# Patient Record
Sex: FEMALE | Race: WHITE | NOT HISPANIC OR LATINO | Employment: OTHER | URBAN - METROPOLITAN AREA
[De-identification: names, ages, dates, MRNs, and addresses within clinical notes are randomized per-mention and may not be internally consistent; named-entity substitution may affect disease eponyms.]

---

## 2017-03-14 ENCOUNTER — OFFICE VISIT (OUTPATIENT)
Dept: ENDOCRINOLOGY | Facility: CLINIC | Age: 57
End: 2017-03-14

## 2017-03-14 VITALS
BODY MASS INDEX: 33.24 KG/M2 | OXYGEN SATURATION: 97 % | DIASTOLIC BLOOD PRESSURE: 78 MMHG | SYSTOLIC BLOOD PRESSURE: 124 MMHG | HEIGHT: 63 IN | WEIGHT: 187.6 LBS | HEART RATE: 72 BPM

## 2017-03-14 DIAGNOSIS — R63.5 WEIGHT GAIN: ICD-10-CM

## 2017-03-14 DIAGNOSIS — E28.2 PCOS (POLYCYSTIC OVARIAN SYNDROME): Primary | ICD-10-CM

## 2017-03-14 DIAGNOSIS — L68.0 HIRSUTISM: ICD-10-CM

## 2017-03-14 LAB
ALBUMIN SERPL-MCNC: 4.3 G/DL (ref 3.2–4.8)
ALBUMIN/GLOB SERPL: 1.7 G/DL (ref 1.5–2.5)
ALP SERPL-CCNC: 75 U/L (ref 25–100)
ALT SERPL W P-5'-P-CCNC: 16 U/L (ref 7–40)
ANION GAP SERPL CALCULATED.3IONS-SCNC: 9 MMOL/L (ref 3–11)
AST SERPL-CCNC: 18 U/L (ref 0–33)
BILIRUB SERPL-MCNC: 0.2 MG/DL (ref 0.3–1.2)
BUN BLD-MCNC: 11 MG/DL (ref 9–23)
BUN/CREAT SERPL: 15.7 (ref 7–25)
CALCIUM SPEC-SCNC: 9.6 MG/DL (ref 8.7–10.4)
CHLORIDE SERPL-SCNC: 109 MMOL/L (ref 99–109)
CO2 SERPL-SCNC: 24 MMOL/L (ref 20–31)
CREAT BLD-MCNC: 0.7 MG/DL (ref 0.6–1.3)
GFR SERPL CREATININE-BSD FRML MDRD: 87 ML/MIN/1.73
GLOBULIN UR ELPH-MCNC: 2.6 GM/DL
GLUCOSE BLD-MCNC: 108 MG/DL (ref 70–100)
POTASSIUM BLD-SCNC: 3.7 MMOL/L (ref 3.5–5.5)
PROT SERPL-MCNC: 6.9 G/DL (ref 5.7–8.2)
SODIUM BLD-SCNC: 142 MMOL/L (ref 132–146)
TSH SERPL DL<=0.05 MIU/L-ACNC: 2.78 MIU/ML (ref 0.35–5.35)

## 2017-03-14 PROCEDURE — 84443 ASSAY THYROID STIM HORMONE: CPT | Performed by: INTERNAL MEDICINE

## 2017-03-14 PROCEDURE — 80053 COMPREHEN METABOLIC PANEL: CPT | Performed by: INTERNAL MEDICINE

## 2017-03-14 PROCEDURE — 99214 OFFICE O/P EST MOD 30 MIN: CPT | Performed by: INTERNAL MEDICINE

## 2017-03-14 RX ORDER — SPIRONOLACTONE 50 MG/1
100 TABLET, FILM COATED ORAL DAILY
Qty: 60 TABLET | Refills: 11 | Status: SHIPPED | OUTPATIENT
Start: 2017-03-14 | End: 2018-01-16 | Stop reason: SDUPTHER

## 2017-03-14 RX ORDER — HYDROCODONE BITARTRATE AND ACETAMINOPHEN 5; 325 MG/1; MG/1
TABLET ORAL
COMMUNITY
Start: 2017-02-14 | End: 2017-03-14

## 2017-03-14 NOTE — PROGRESS NOTES
Subjective:   Polycystic Ovary Syndrome (F/u for PCOS, C/o fluctuations in weight, pt stated she stopped taking metformin on Monday due to having diarrhea, fatigue and night sweats.)        Kelly Meza is a 57 y.o. female who is being seen for consultation today at the request of No ref. provider found   follow-up for hyperglycemia, abnormal thyroid function tests and obesity. In July 2016 patient had evaluation by primary doctor and labs showed normal prolactin of 6.4 and free T4 of 1, TSH of 2.66. She reported family history of thyroid disease.  Patient reported difficulty with weight loss despite diet and exercises. She is currently in HMR program. - lost  20 lbs since June, then the weight stabilized and she stopped the program. Now she is going with the weight watchers with no effect.      She also has a symptoms of facial hair and started on Aviane. Changed to low Seasonique last visit.     Symptoms consist of weight gain, hirsutism  She is very frustrated with the weight loss situation.   Last visit started metformin and developed side effects of diarrhea, increased sweating.     Heat intolerance, swelling and diarrhea improved after stopping metformin last week. SHe didn't gain any benefit for weight while taking, it .   She is following weight watchers, stopped HMR.      Review of Systems  Review of Systems   Constitutional: Positive for appetite change (poor appetite), fatigue and unexpected weight change (weight gain). Negative for activity change, chills and diaphoresis.   HENT: Negative for congestion, ear pain, facial swelling, hearing loss, postnasal drip and trouble swallowing.    Eyes: Negative for visual disturbance.   Respiratory: Negative for cough.    Cardiovascular: Negative for chest pain, palpitations and leg swelling.   Gastrointestinal: Negative for abdominal distention, abdominal pain, constipation, diarrhea, nausea and vomiting.   Endocrine: Positive for cold intolerance and heat  intolerance.        As listed in HPI   Genitourinary: Negative.  Negative for menstrual problem (she had menses up until 50 yo when started OCP. ).   Musculoskeletal: Positive for back pain and neck pain. Negative for arthralgias, joint swelling, myalgias and neck stiffness.   Skin: Negative.         Facial hair   Allergic/Immunologic: Positive for environmental allergies.   Neurological: Positive for weakness, numbness (tingling in the right arm>left. ) and headaches. Negative for dizziness, tremors, syncope and light-headedness.   Hematological: Negative.    Psychiatric/Behavioral: Positive for sleep disturbance. The patient is nervous/anxious.    All other systems reviewed and are negative.     Current medications:  Current Outpatient Prescriptions   Medication Sig Dispense Refill   • clonazePAM (KlonoPIN) 1 MG tablet      • Levonorgest-Eth Estrad 91-Day 0.15-0.03 &0.01 MG tablet Take 1 tablet by mouth Daily. 91 each 1   • levonorgestrel-ethinyl estradiol (AVIANE,ALESSE,LESSINA) 0.1-20 MG-MCG per tablet 1 daily     • mometasone-formoterol (DULERA 100) 100-5 MCG/ACT inhaler Inhale 2 puffs 2 (Two) Times a Day.     • Multiple Vitamins-Minerals (MULTIVITAMIN ADULT PO) Take  by mouth.     • PREMARIN 0.625 MG/GM vaginal cream      • QUEtiapine (SEROquel) 200 MG tablet      • VENTOLIN  (90 BASE) MCG/ACT inhaler PRN       No current facility-administered medications for this visit.        The following portions of the patient's history were reviewed and updated as appropriate: She  has a past medical history of Asthma; Basal cell carcinoma of skin; Bilateral ovarian cysts; Cervical spine disease; Heart murmur; and Migraine.  She  has a past surgical history that includes Breast lumpectomy (Right, 2009); Rotator cuff repair (Right); and Finger surgery.  Her family history includes Arthritis in her mother; Cancer in her father and mother; Hypertension in her maternal grandfather and mother; Kidney disease in her  paternal grandmother; Liver disease in her father; Osteoporosis in her maternal grandmother; Thyroid disease in her mother.  She  reports that she has never smoked. She has never used smokeless tobacco. She reports that she does not drink alcohol. Her drug history is not on file.  She is allergic to ciprofloxacin; latex; codeine sulfate; and pollen extract..      Objective:     Vitals:    03/14/17 1619   BP: 124/78   Pulse: 72   SpO2: 97%     Physical Exam   Constitutional: She is oriented to person, place, and time. She appears well-developed and well-nourished.   HENT:   Head: Normocephalic and atraumatic.   Eyes: Conjunctivae are normal.   Neck: No thyromegaly present.   The neck is supple and symmetric   Cardiovascular: Normal rate, regular rhythm and normal heart sounds.    Pulmonary/Chest: Effort normal and breath sounds normal.   Musculoskeletal: She exhibits no edema.   Lymphadenopathy:     She has no cervical adenopathy.   Neurological: She is alert and oriented to person, place, and time.   Skin: Skin is warm and dry. No rash noted.   hirsutism of the chin and cheeks, neck.    Psychiatric: She has a normal mood and affect. Thought content normal.   Vitals reviewed.      LABS AND IMAGING          Assessment:        Problem List Items Addressed This Visit        Endocrine    PCOS (polycystic ovarian syndrome) - Primary    Relevant Orders    Comprehensive Metabolic Panel    TSH       Musculoskeletal and Integument    Hirsutism       Other    Weight gain               Plan:          PCOS with the sx of wieght gain and hirsutism. Different management options reviewed.     Continue with the weight loss program and I have gone over different strategies to maintain low calorie Intake.   -different weight loss medications available on the market reviewed and info was handed to her. (Qsymia, Saxenda, Contrave)  -start Contrave, side effects, coupons and booklet on this medication was provided to her.     - for  hirsutism consider spironolactone, start at 50 mg and slowly titrate up to 100 mg a day as tolerated. I have explained that this is the dose effective for hirsutism. Side effects reviewed. .          Follow-up in 4 months

## 2017-03-29 ENCOUNTER — TRANSCRIBE ORDERS (OUTPATIENT)
Dept: ADMINISTRATIVE | Facility: HOSPITAL | Age: 57
End: 2017-03-29

## 2017-03-29 ENCOUNTER — APPOINTMENT (OUTPATIENT)
Dept: LAB | Facility: HOSPITAL | Age: 57
End: 2017-03-29

## 2017-03-29 DIAGNOSIS — J45.51 SEVERE PERSISTENT ASTHMA WITH EXACERBATION: Primary | ICD-10-CM

## 2017-03-29 LAB
DEPRECATED RDW RBC AUTO: 43.4 FL (ref 37–54)
EOSINOPHIL # BLD MANUAL: 0.18 10*3/MM3 (ref 0–0.7)
EOSINOPHIL NFR BLD MANUAL: 3 % (ref 0–7)
ERYTHROCYTE [DISTWIDTH] IN BLOOD BY AUTOMATED COUNT: 13.2 % (ref 11.5–14.5)
HCT VFR BLD AUTO: 39.4 % (ref 37–47)
HGB BLD-MCNC: 13.1 G/DL (ref 12–16)
LYMPHOCYTES # BLD MANUAL: 0.84 10*3/MM3 (ref 0.6–3.4)
LYMPHOCYTES NFR BLD MANUAL: 14 % (ref 10–50)
LYMPHOCYTES NFR BLD MANUAL: 9 % (ref 0–12)
MCH RBC QN AUTO: 29.5 PG (ref 27–31)
MCHC RBC AUTO-ENTMCNC: 33.2 G/DL (ref 30–37)
MCV RBC AUTO: 88.7 FL (ref 81–99)
MONOCYTES # BLD AUTO: 0.54 10*3/MM3 (ref 0–0.9)
NEUTROPHILS # BLD AUTO: 4.45 10*3/MM3 (ref 2–6.9)
NEUTROPHILS NFR BLD MANUAL: 72 % (ref 37–80)
NEUTS BAND NFR BLD MANUAL: 2 % (ref 0–6)
PLATELET # BLD AUTO: 251 10*3/MM3 (ref 130–400)
PMV BLD AUTO: 13.3 FL (ref 6–12)
RBC # BLD AUTO: 4.44 10*6/MM3 (ref 4.2–5.4)
RBC MORPH BLD: NORMAL
SMALL PLATELETS BLD QL SMEAR: ADEQUATE
WBC MORPH BLD: NORMAL
WBC NRBC COR # BLD: 6.01 10*3/MM3 (ref 4.8–10.8)

## 2017-03-29 PROCEDURE — 85007 BL SMEAR W/DIFF WBC COUNT: CPT | Performed by: ALLERGY & IMMUNOLOGY

## 2017-03-29 PROCEDURE — 82785 ASSAY OF IGE: CPT | Performed by: ALLERGY & IMMUNOLOGY

## 2017-03-29 PROCEDURE — 36415 COLL VENOUS BLD VENIPUNCTURE: CPT | Performed by: ALLERGY & IMMUNOLOGY

## 2017-03-29 PROCEDURE — 85027 COMPLETE CBC AUTOMATED: CPT | Performed by: ALLERGY & IMMUNOLOGY

## 2017-03-30 LAB — TOTAL IGE SMQN RAST: 47 IU/ML (ref 0–100)

## 2017-04-04 ENCOUNTER — OFFICE VISIT (OUTPATIENT)
Dept: NEUROLOGY | Facility: CLINIC | Age: 57
End: 2017-04-04

## 2017-04-04 VITALS
HEIGHT: 63 IN | DIASTOLIC BLOOD PRESSURE: 90 MMHG | OXYGEN SATURATION: 97 % | HEART RATE: 100 BPM | SYSTOLIC BLOOD PRESSURE: 146 MMHG | WEIGHT: 186 LBS | BODY MASS INDEX: 32.96 KG/M2

## 2017-04-04 DIAGNOSIS — G47.01 INSOMNIA DUE TO MEDICAL CONDITION: Primary | ICD-10-CM

## 2017-04-04 PROCEDURE — 99213 OFFICE O/P EST LOW 20 MIN: CPT | Performed by: PSYCHIATRY & NEUROLOGY

## 2017-04-04 RX ORDER — PROMETHAZINE HYDROCHLORIDE 25 MG/1
TABLET ORAL
COMMUNITY
Start: 2017-01-31 | End: 2018-05-02

## 2017-04-04 RX ORDER — MOMETASONE FUROATE AND FORMOTEROL FUMARATE DIHYDRATE 200; 5 UG/1; UG/1
AEROSOL RESPIRATORY (INHALATION)
COMMUNITY
Start: 2017-03-28 | End: 2019-07-04 | Stop reason: HOSPADM

## 2017-04-04 NOTE — PROGRESS NOTES
[facility] NEUROLOGY [place] PROGRESS NOTE  History of Present Illness     Date: 4/4/2017    Patient Identification  Kelly Meza is a 57 y.o. female.    Patient information was obtained from patient.  History/Exam limitations: none.    Original consultation requested by: Solomon Li M.D.      Chief Complaint   Sleeping Problem (Pt here for 3 mo follow up on sleep)      History of Present Illness   Patient is a pleasant 57-year-old who had been diagnosed of insomnia we have counseled patient extensively today on sleep hygiene.  Patient reported that she is taking her Seroquel right at bedtime instead of 2 hours before bedtime as instructed and she is only taking half a pill.  I have counseled patient extensively the importance of compliance with medication regimen and ient expressed understanding    PMH:   Past Medical History:   Diagnosis Date   • Asthma    • Basal cell carcinoma of skin    • Bilateral ovarian cysts    • Cervical spine disease    • Heart murmur    • Migraine        Past Surgical History:   Past Surgical History:   Procedure Laterality Date   • BREAST LUMPECTOMY Right 2009   • FINGER SURGERY     • ROTATOR CUFF REPAIR Right        Family Hisotry:   Family History   Problem Relation Age of Onset   • Arthritis Mother    • Cancer Mother    • Hypertension Mother    • Thyroid disease Mother    • Cancer Father    • Liver disease Father    • Osteoporosis Maternal Grandmother    • Hypertension Maternal Grandfather    • Kidney disease Paternal Grandmother        Social History:   Social History     Social History   • Marital status:      Spouse name: N/A   • Number of children: N/A   • Years of education: N/A     Occupational History   • Not on file.     Social History Main Topics   • Smoking status: Never Smoker   • Smokeless tobacco: Never Used   • Alcohol use No   • Drug use: Not on file   • Sexual activity: Not on file     Other Topics Concern   • Not on file     Social History  Narrative       Medications:   Current Outpatient Prescriptions   Medication Sig Dispense Refill   • clonazePAM (KlonoPIN) 1 MG tablet      • DULERA 200-5 MCG/ACT inhaler Use as directed     • Levonorgest-Eth Estrad 91-Day 0.15-0.03 &0.01 MG tablet Take 1 tablet by mouth Daily. 91 each 1   • Multiple Vitamins-Minerals (MULTIVITAMIN ADULT PO) Take  by mouth.     • PREMARIN 0.625 MG/GM vaginal cream      • QUEtiapine (SEROquel) 200 MG tablet      • spironolactone (ALDACTONE) 50 MG tablet Take 2 tablets by mouth Daily. Start 1 tab daily for 1 month, increase to 1.5 tab daily for 2 weeks, then increase to 2 tabs daily. 60 tablet 11   • VENTOLIN  (90 BASE) MCG/ACT inhaler PRN     • levonorgestrel-ethinyl estradiol (AVIANE,ALESSE,LESSINA) 0.1-20 MG-MCG per tablet 1 daily     • naltrexone-bupropion ER (CONTRAVE) 8-90 MG tablet Wk 1: 1 tab daily, Wk 2: 1 tab twice a day, Wk 3: 2 tabs in AM, 1 tab in PM, Wk 4: 2 tabs twice a day, Maintenance dose: 2 tabs twice daily. 120 tablet 4   • promethazine (PHENERGAN) 25 MG tablet Use PRN       No current facility-administered medications for this visit.        Allergy:   Allergies   Allergen Reactions   • Ciprofloxacin Anaphylaxis   • Latex Hives and Shortness Of Breath   • Codeine Sulfate Nausea And Vomiting   • Pollen Extract        Review of Systems:  Review of Systems   Constitutional: Positive for fatigue. Negative for chills and fever.   HENT: Negative for congestion, ear pain, hearing loss, rhinorrhea and sore throat.    Eyes: Negative for pain, discharge and redness.   Respiratory: Negative for cough, shortness of breath, wheezing and stridor.    Cardiovascular: Negative for chest pain, palpitations and leg swelling.   Gastrointestinal: Negative for abdominal pain, constipation, nausea and vomiting.   Endocrine: Negative for cold intolerance, heat intolerance and polyphagia.   Genitourinary: Negative for dysuria, flank pain, frequency and urgency.   Musculoskeletal:  "Negative for joint swelling, myalgias, neck pain and neck stiffness.   Skin: Negative for pallor, rash and wound.   Allergic/Immunologic: Negative for environmental allergies.   Neurological: Negative for dizziness, tremors, seizures, syncope, facial asymmetry, speech difficulty, weakness, light-headedness, numbness and headaches.   Hematological: Negative for adenopathy.   Psychiatric/Behavioral: Positive for sleep disturbance. Negative for confusion and hallucinations. The patient is not nervous/anxious.        Physical Exam     Vitals:    04/04/17 1029   BP: 146/90   Pulse: 100   SpO2: 97%   Weight: 186 lb (84.4 kg)   Height: 63\" (160 cm)     GENERAL: Patient is pleasant, cooperative, appears to be stated age.  Body habitus is endomorphic.  SKIN AND EXTREMITIES:  No skin rashes or lesions are noted.  No cyanosis, clubbing or edema of the extremities.    HEAD:  Head is normocephalic and atraumatic.    NECK: Neck are non-tender without thyromegaly or adenopathy.  Carotic upstrokes are 1+/4.  No cranial or cervical bruits.  The neck is supple with a full range of motion.   ENT: palate elevate symmetrically, no evidence of high arch palate, tongue midline erythema in posterior pharynx, Mallampati Classification Class III   CARDIOVASCULAR:  Regular rate and rhythm with normal S1 and S2 without rub or gallop.  RESPIRATORY:  Clear to auscultation without wheezes or crackle   ABDOMEN:  Soft and non-tender, positive bowel sound without hepatosplenomegaly  BACK:  Back is straight without midline defect.    PSYCH:  Higher cortical function/mental status:  The patient is alert.  She is oriented x3 to time, place and person.  Recent and the remote memory appear normal.  The patient has a good fund of knowledge.  There is no visual or auditory hallucination or suicidal or homicidal ideation.  SPEECH:There is no gross evidence of aphasia, dysarthria or agnosia.      CRANIAL NERVES:  Pupils are 4mm, equal round reactive to " light, reacting briskly to 2mm without afferent pupillary defect.  Visual fields are intact to confrontation testing.  Fundoscopic examination reveals sharp disk margins with normal vasculature.  No papilledema, hemorrhages or exudates.  Extraocular movements are full and smooth with normal pursuits and saccades.  No nystagmus noted.  The face is symmetric. palate elevate symmetrically, Tongue midline, positive gag reflex. The remainder of the cranial nerves are intact and symmetrical.    MOTOR: Strength is 5/5 throughout with normal tone and bulk with the following exceptions, 4/5 intrinsic muscles of the hands and feet.  No involuntary movements noted.    Deep Tendon Reflexes: are 2/4 and symmetrical in the upper extremities, 2/4 and symmetrical at the knees and 1/4 and symmetrical at the Achilles tendon.  Plantar responses were down-going bilaterally.    SENSATION:  Intact to pinprick, light touch, vibration and proprioception.  Coordination:  The patient normally performs finger-nose-finger, heel-to-knee-to-shin and rapid alternating movements in symmetrical fashion.    COORDINATION AND GAIT:  The patient walks with a narrow-based gait.  Patient is able to heel-toe and tandem walk forward and backwards without difficulty.  Romberg and monopedal  Romberg are negative.    MUSCULOSKELETAL: Range of motion normal, no clubbing, cyanosis, or edema.  No joint swelling.            Studies: I have personally reviewed the following and discussed with the patient.  Results for orders placed or performed in visit on 03/29/17   IgE   Result Value Ref Range    IgE 47 0 - 100 IU/mL   CBC Auto Differential   Result Value Ref Range    WBC 6.01 4.80 - 10.80 10*3/mm3    RBC 4.44 4.20 - 5.40 10*6/mm3    Hemoglobin 13.1 12.0 - 16.0 g/dL    Hematocrit 39.4 37.0 - 47.0 %    MCV 88.7 81.0 - 99.0 fL    MCH 29.5 27.0 - 31.0 pg    MCHC 33.2 30.0 - 37.0 g/dL    RDW 13.2 11.5 - 14.5 %    RDW-SD 43.4 37.0 - 54.0 fl    MPV 13.3 (H) 6.0 - 12.0  fL    Platelets 251 130 - 400 10*3/mm3   Manual Differential   Result Value Ref Range    Neutrophil % 72.0 37.0 - 80.0 %    Lymphocyte % 14.0 10.0 - 50.0 %    Monocyte % 9.0 0.0 - 12.0 %    Eosinophil % 3.0 0.0 - 7.0 %    Bands %  2.0 0.0 - 6.0 %    Neutrophils Absolute 4.45 2.00 - 6.90 10*3/mm3    Lymphocytes Absolute 0.84 0.60 - 3.40 10*3/mm3    Monocytes Absolute 0.54 0.00 - 0.90 10*3/mm3    Eosinophils Absolute 0.18 0.00 - 0.70 10*3/mm3    RBC Morphology Normal Normal    WBC Morphology Normal Normal    Platelet Estimate Adequate Normal     Treatments:  1. Counseled patient on sleep hygiene  2.  Discuss about stimulus control therapy  3.  Encourage regular sleep wake schedule  4.  Avoidance of sleep deprivation  5.  Avoidance of taking naps throughout the whole day  6.  Having regular light exercise for out before bedtime  7.  Patient reported that she is taking her Seroquel right at bedtime instead of 2 hours before bedtime as instructed and she is only taking half a pill.  I have counseled patient extensively the importance of compliance with medication regimen and ient expressed understanding    This Document is signed by Александр Burns MD, FAAN, FAASM   April 4, 201711:09 AM

## 2017-04-24 RX ORDER — LEVONORGESTREL AND ETHINYL ESTRADIOL 150-30(84)
KIT ORAL
Qty: 30 EACH | Refills: 1 | Status: SHIPPED | OUTPATIENT
Start: 2017-04-24 | End: 2017-08-04 | Stop reason: SDUPTHER

## 2017-06-22 ENCOUNTER — OFFICE VISIT (OUTPATIENT)
Dept: NEUROLOGY | Facility: CLINIC | Age: 57
End: 2017-06-22

## 2017-06-22 VITALS
SYSTOLIC BLOOD PRESSURE: 126 MMHG | BODY MASS INDEX: 33.1 KG/M2 | DIASTOLIC BLOOD PRESSURE: 82 MMHG | HEIGHT: 63 IN | WEIGHT: 186.8 LBS | OXYGEN SATURATION: 98 % | HEART RATE: 97 BPM

## 2017-06-22 DIAGNOSIS — G47.01 INSOMNIA DUE TO MEDICAL CONDITION: Primary | ICD-10-CM

## 2017-06-22 DIAGNOSIS — G20 PARKINSON'S DISEASE (HCC): ICD-10-CM

## 2017-06-22 PROCEDURE — 99213 OFFICE O/P EST LOW 20 MIN: CPT | Performed by: PSYCHIATRY & NEUROLOGY

## 2017-06-22 RX ORDER — QUETIAPINE FUMARATE 200 MG/1
200 TABLET, FILM COATED ORAL NIGHTLY
Qty: 30 TABLET | Refills: 5 | Status: SHIPPED | OUTPATIENT
Start: 2017-06-22 | End: 2017-12-22 | Stop reason: ALTCHOICE

## 2017-06-22 RX ORDER — OMALIZUMAB 202.5 MG/1.4ML
INJECTION, SOLUTION SUBCUTANEOUS
COMMUNITY
Start: 2017-05-25 | End: 2019-07-04 | Stop reason: HOSPADM

## 2017-06-22 RX ORDER — RANITIDINE HYDROCHLORIDE 15 MG/ML
75 SOLUTION ORAL 2 TIMES DAILY PRN
COMMUNITY
Start: 2017-06-12 | End: 2019-07-04 | Stop reason: HOSPADM

## 2017-06-22 RX ORDER — ALBUTEROL SULFATE 2.5 MG/3ML
SOLUTION RESPIRATORY (INHALATION)
COMMUNITY
Start: 2017-05-17 | End: 2019-07-04 | Stop reason: HOSPADM

## 2017-06-22 RX ORDER — ROPINIROLE 0.25 MG/1
TABLET, FILM COATED ORAL
COMMUNITY
Start: 2017-06-13 | End: 2018-06-22 | Stop reason: ALTCHOICE

## 2017-06-22 RX ORDER — ALBUTEROL SULFATE 90 UG/1
POWDER, METERED RESPIRATORY (INHALATION)
COMMUNITY
Start: 2017-05-17 | End: 2018-09-25 | Stop reason: SDUPTHER

## 2017-06-22 NOTE — PROGRESS NOTES
"[facility] NEUROLOGY [place] PROGRESS NOTE  History of Present Illness     Date: 6/22/2017    Patient IdentificationAmalia Meza is a 57 y.o. female.    Patient information was obtained from patient.  History/Exam limitations: none.    Original consultation requested by: Solomon Li MD      Chief Complaint   Insomnia (Pt here for 2 month follow up, pt states sx are \"somewhat better\" ) and Parkinson's Disease (Pt states she was dx'ed with parkinson's 05/2017)      History of Present Illness   Patient is a pleasant 57-year-old lady who been diagnosed of insomnia and patient reported her insomnia has improved however she recently diagnosis of Parkinson disease and was just started on Requip 0.25 mg 1 pill twice a day.    PMH:   Past Medical History:   Diagnosis Date   • Asthma    • Basal cell carcinoma of skin    • Bilateral ovarian cysts    • Cervical spine disease    • Heart murmur    • Migraine    • Parkinson disease        Past Surgical History:   Past Surgical History:   Procedure Laterality Date   • BREAST LUMPECTOMY Right 2009   • FINGER SURGERY     • ROTATOR CUFF REPAIR Right        Family Hisotry:   Family History   Problem Relation Age of Onset   • Arthritis Mother    • Cancer Mother    • Hypertension Mother    • Thyroid disease Mother    • Cancer Father    • Liver disease Father    • Osteoporosis Maternal Grandmother    • Hypertension Maternal Grandfather    • Kidney disease Paternal Grandmother        Social History:   Social History     Social History   • Marital status:      Spouse name: N/A   • Number of children: N/A   • Years of education: N/A     Occupational History   • Not on file.     Social History Main Topics   • Smoking status: Never Smoker   • Smokeless tobacco: Never Used   • Alcohol use No   • Drug use: Not on file   • Sexual activity: Not on file     Other Topics Concern   • Not on file     Social History Narrative       Medications:   Current Outpatient " Prescriptions   Medication Sig Dispense Refill   • albuterol (PROVENTIL) (2.5 MG/3ML) 0.083% nebulizer solution      • clonazePAM (KlonoPIN) 1 MG tablet      • DAYSEE 0.15-0.03 &0.01 MG tablet TAKE 1 TABLET BY MOUTH ONE TIME A DAY  30 each 1   • DULERA 200-5 MCG/ACT inhaler Use as directed     • levonorgestrel-ethinyl estradiol (AVIANE,ALESSE,LESSINA) 0.1-20 MG-MCG per tablet 1 daily     • Multiple Vitamins-Minerals (MULTIVITAMIN ADULT PO) Take  by mouth.     • naltrexone-bupropion ER (CONTRAVE) 8-90 MG tablet Wk 1: 1 tab daily, Wk 2: 1 tab twice a day, Wk 3: 2 tabs in AM, 1 tab in PM, Wk 4: 2 tabs twice a day, Maintenance dose: 2 tabs twice daily. 120 tablet 4   • PREMARIN 0.625 MG/GM vaginal cream      • PROAIR RESPICLICK 108 (90 BASE) MCG/ACT inhaler      • promethazine (PHENERGAN) 25 MG tablet Use PRN     • QUEtiapine (SEROquel) 200 MG tablet Take 1 tablet by mouth Every Night. 30 tablet 5   • ranitidine (ZANTAC) 75 MG/5ML syrup      • rOPINIRole (REQUIP) 0.25 MG tablet      • spironolactone (ALDACTONE) 50 MG tablet Take 2 tablets by mouth Daily. Start 1 tab daily for 1 month, increase to 1.5 tab daily for 2 weeks, then increase to 2 tabs daily. 60 tablet 11   • VENTOLIN  (90 BASE) MCG/ACT inhaler PRN     • XOLAIR 150 MG injection        No current facility-administered medications for this visit.        Allergy:   Allergies   Allergen Reactions   • Ciprofloxacin Anaphylaxis   • Latex Hives and Shortness Of Breath   • Codeine Sulfate Nausea And Vomiting   • Pollen Extract        Review of Systems:  Review of Systems   Constitutional: Negative for chills and fever.   HENT: Negative for congestion, ear pain, hearing loss, rhinorrhea and sore throat.    Eyes: Negative for pain, discharge and redness.   Respiratory: Negative for cough, shortness of breath, wheezing and stridor.    Cardiovascular: Negative for chest pain, palpitations and leg swelling.   Gastrointestinal: Negative for abdominal pain,  "constipation, nausea and vomiting.   Endocrine: Negative for cold intolerance, heat intolerance and polyphagia.   Genitourinary: Negative for dysuria, flank pain, frequency and urgency.   Musculoskeletal: Positive for gait problem. Negative for joint swelling, myalgias, neck pain and neck stiffness.   Skin: Negative for pallor, rash and wound.   Allergic/Immunologic: Negative for environmental allergies.   Neurological: Positive for tremors. Negative for dizziness, seizures, syncope, facial asymmetry, speech difficulty, weakness, light-headedness, numbness and headaches.   Hematological: Negative for adenopathy.   Psychiatric/Behavioral: Positive for sleep disturbance. Negative for confusion and hallucinations. The patient is not nervous/anxious.        Physical Exam     Vitals:    06/22/17 0942   BP: 126/82   Pulse: 97   SpO2: 98%   Weight: 186 lb 12.8 oz (84.7 kg)   Height: 63\" (160 cm)     GENERAL: Patient is pleasant, cooperative, appears to be stated age.  Body habitus is endomorphic.  SKIN AND EXTREMITIES:  No skin rashes or lesions are noted.  No cyanosis, clubbing or edema of the extremities.    HEAD:  Head is normocephalic and atraumatic.    NECK: Neck are non-tender without thyromegaly or adenopathy.  Carotic upstrokes are 1+/4.  No cranial or cervical bruits.  The neck is supple with a full range of motion.   ENT: palate elevate symmetrically, no evidence of high arch palate, tongue midline erythema in posterior pharynx, Mallampati Classification Class III   CARDIOVASCULAR:  Regular rate and rhythm with normal S1 and S2 without rub or gallop.  RESPIRATORY:  Clear to auscultation without wheezes or crackle   ABDOMEN:  Soft and non-tender, positive bowel sound without hepatosplenomegaly  BACK:  Back is straight without midline defect.    PSYCH:  Higher cortical function/mental status:  The patient is alert.  She is oriented x3 to time, place and person.  Recent and the remote memory appear normal.  The " patient has a good fund of knowledge.  There is no visual or auditory hallucination or suicidal or homicidal ideation.  SPEECH:There is no gross evidence of aphasia, dysarthria or agnosia.      CRANIAL NERVES:  Pupils are 4mm, equal round reactive to light, reacting briskly to 2mm without afferent pupillary defect.  Visual fields are intact to confrontation testing.  Fundoscopic examination reveals sharp disk margins with normal vasculature.  No papilledema, hemorrhages or exudates.  Extraocular movements are full and smooth with normal pursuits and saccades.  No nystagmus noted.  The face is symmetric. palate elevate symmetrically, Tongue midline, positive gag reflex. The remainder of the cranial nerves are intact and symmetrical.    MOTOR: Strength is 5/5 throughout with normal tone and bulk with the following exceptions, 4/5 intrinsic muscles of the hands and feet.  No involuntary movements noted.    Deep Tendon Reflexes: are 2/4 and symmetrical in the upper extremities, 2/4 and symmetrical at the knees and 1/4 and symmetrical at the Achilles tendon.  Plantar responses were down-going bilaterally.    SENSATION:  Intact to pinprick, light touch, vibration and proprioception.  Coordination:  The patient normally performs finger-nose-finger, heel-to-knee-to-shin and rapid alternating movements in symmetrical fashion.    COORDINATION AND GAIT:  The patient walks with a narrow-based gait.  Patient is able to heel-toe and tandem walk forward and backwards without difficulty.  Romberg and monopedal  Romberg are negative.    MUSCULOSKELETAL: Range of motion normal, no clubbing, cyanosis, or edema.  No joint swelling.            Studies: I have personally reviewed the following and discussed with the patient.  Results for orders placed or performed in visit on 03/29/17   IgE   Result Value Ref Range    IgE 47 0 - 100 IU/mL   CBC Auto Differential   Result Value Ref Range    WBC 6.01 4.80 - 10.80 10*3/mm3    RBC 4.44 4.20  - 5.40 10*6/mm3    Hemoglobin 13.1 12.0 - 16.0 g/dL    Hematocrit 39.4 37.0 - 47.0 %    MCV 88.7 81.0 - 99.0 fL    MCH 29.5 27.0 - 31.0 pg    MCHC 33.2 30.0 - 37.0 g/dL    RDW 13.2 11.5 - 14.5 %    RDW-SD 43.4 37.0 - 54.0 fl    MPV 13.3 (H) 6.0 - 12.0 fL    Platelets 251 130 - 400 10*3/mm3   Manual Differential   Result Value Ref Range    Neutrophil % 72.0 37.0 - 80.0 %    Lymphocyte % 14.0 10.0 - 50.0 %    Monocyte % 9.0 0.0 - 12.0 %    Eosinophil % 3.0 0.0 - 7.0 %    Bands %  2.0 0.0 - 6.0 %    Neutrophils Absolute 4.45 2.00 - 6.90 10*3/mm3    Lymphocytes Absolute 0.84 0.60 - 3.40 10*3/mm3    Monocytes Absolute 0.54 0.00 - 0.90 10*3/mm3    Eosinophils Absolute 0.18 0.00 - 0.70 10*3/mm3    RBC Morphology Normal Normal    WBC Morphology Normal Normal    Platelet Estimate Adequate Normal     Records Reviewed: I have personally reviewed her previous medical record.    Kelly was seen today for insomnia and parkinson's disease.    Diagnoses and all orders for this visit:    Insomnia due to medical condition    Parkinson's disease    Other orders  -     QUEtiapine (SEROquel) 200 MG tablet; Take 1 tablet by mouth Every Night.      Treatments:  1.  Her insomnia has improved with like to continue patient on 200 mg 1 by mouth at nighttime  2.  T patient ropinirole for her Parkinson    This Document is signed by Александр Burns MD, FAAN, FAASM   June 22, 20178:52 PM

## 2017-07-14 ENCOUNTER — OFFICE VISIT (OUTPATIENT)
Dept: ENDOCRINOLOGY | Facility: CLINIC | Age: 57
End: 2017-07-14

## 2017-07-14 VITALS
HEIGHT: 63 IN | OXYGEN SATURATION: 98 % | WEIGHT: 181.6 LBS | BODY MASS INDEX: 32.18 KG/M2 | SYSTOLIC BLOOD PRESSURE: 126 MMHG | HEART RATE: 74 BPM | DIASTOLIC BLOOD PRESSURE: 76 MMHG

## 2017-07-14 DIAGNOSIS — E28.2 PCOS (POLYCYSTIC OVARIAN SYNDROME): Primary | ICD-10-CM

## 2017-07-14 DIAGNOSIS — E88.81 INSULIN RESISTANCE: ICD-10-CM

## 2017-07-14 LAB
ALBUMIN SERPL-MCNC: 4.4 G/DL (ref 3.2–4.8)
ALBUMIN/GLOB SERPL: 1.7 G/DL (ref 1.5–2.5)
ALP SERPL-CCNC: 81 U/L (ref 25–100)
ALT SERPL W P-5'-P-CCNC: 25 U/L (ref 7–40)
ANION GAP SERPL CALCULATED.3IONS-SCNC: 10 MMOL/L (ref 3–11)
AST SERPL-CCNC: 26 U/L (ref 0–33)
BILIRUB SERPL-MCNC: 0.3 MG/DL (ref 0.3–1.2)
BUN BLD-MCNC: 14 MG/DL (ref 9–23)
BUN/CREAT SERPL: 11.7 (ref 7–25)
CALCIUM SPEC-SCNC: 10 MG/DL (ref 8.7–10.4)
CHLORIDE SERPL-SCNC: 106 MMOL/L (ref 99–109)
CO2 SERPL-SCNC: 24 MMOL/L (ref 20–31)
CREAT BLD-MCNC: 1.2 MG/DL (ref 0.6–1.3)
GFR SERPL CREATININE-BSD FRML MDRD: 46 ML/MIN/1.73
GLOBULIN UR ELPH-MCNC: 2.6 GM/DL
GLUCOSE BLD-MCNC: 120 MG/DL (ref 70–100)
POTASSIUM BLD-SCNC: 4.6 MMOL/L (ref 3.5–5.5)
PROT SERPL-MCNC: 7 G/DL (ref 5.7–8.2)
SODIUM BLD-SCNC: 140 MMOL/L (ref 132–146)
TSH SERPL DL<=0.05 MIU/L-ACNC: 2.36 MIU/ML (ref 0.35–5.35)

## 2017-07-14 PROCEDURE — 80053 COMPREHEN METABOLIC PANEL: CPT | Performed by: INTERNAL MEDICINE

## 2017-07-14 PROCEDURE — 84443 ASSAY THYROID STIM HORMONE: CPT | Performed by: INTERNAL MEDICINE

## 2017-07-14 PROCEDURE — 99213 OFFICE O/P EST LOW 20 MIN: CPT | Performed by: INTERNAL MEDICINE

## 2017-07-14 NOTE — PROGRESS NOTES
Subjective:   Polycystic Ovary Syndrome (F/u for PCOS )        Kelly Meza is a 57 y.o. female who is being seen for follow-up for hyperglycemia, abnormal thyroid function tests and obesity. In July 2016 patient had evaluation by primary doctor and labs showed normal prolactin of 6.4 and free T4 of 1, TSH of 2.66. She reported family history of thyroid disease.  Patient reported difficulty with weight loss despite diet and exercises. She is currently in HMR program - lost  20 lbs since June, then the weight stabilized and she stopped the program. Now she is going with the weight watchers with no effect.      Started Contrave in the end of May, lost a few lbs. Doing well, no side effects.   Spironolactone increased to 100 mg daily with very minimal effect. Still has hirsutism.  2 weeks ago she was diagnosed with Parkinson's disease, frustrated about diagnoses. She has episodes of mobility problems, shoveling gait at times. Started requip     Review of Systems  Review of Systems   Constitutional: Positive for appetite change (poor appetite), fatigue and unexpected weight change (weight gain). Negative for activity change, chills and diaphoresis.   HENT: Negative for congestion, ear pain, facial swelling, hearing loss, postnasal drip and trouble swallowing.    Eyes: Negative for visual disturbance.   Respiratory: Negative for cough.    Cardiovascular: Negative for chest pain, palpitations and leg swelling.   Gastrointestinal: Negative for abdominal distention, abdominal pain, constipation, diarrhea, nausea and vomiting.   Endocrine: Positive for cold intolerance and heat intolerance.        As listed in HPI   Genitourinary: Negative.  Negative for menstrual problem (she had menses up until 52 yo when started OCP. ).   Musculoskeletal: Positive for back pain and neck pain. Negative for arthralgias, joint swelling, myalgias and neck stiffness.   Skin: Negative.         Facial hair   Allergic/Immunologic: Positive  for environmental allergies.   Neurological: Positive for weakness, numbness (tingling in the right arm>left. ) and headaches. Negative for dizziness, tremors, syncope and light-headedness.   Hematological: Negative.    Psychiatric/Behavioral: Positive for sleep disturbance. The patient is nervous/anxious.    All other systems reviewed and are negative.     Current medications:  Current Outpatient Prescriptions   Medication Sig Dispense Refill   • albuterol (PROVENTIL) (2.5 MG/3ML) 0.083% nebulizer solution      • clonazePAM (KlonoPIN) 1 MG tablet      • DAYSEE 0.15-0.03 &0.01 MG tablet TAKE 1 TABLET BY MOUTH ONE TIME A DAY  30 each 1   • DULERA 200-5 MCG/ACT inhaler Use as directed     • levonorgestrel-ethinyl estradiol (AVIANE,ALESSE,LESSINA) 0.1-20 MG-MCG per tablet 1 daily     • Multiple Vitamins-Minerals (MULTIVITAMIN ADULT PO) Take  by mouth.     • naltrexone-bupropion ER (CONTRAVE) 8-90 MG tablet Wk 1: 1 tab daily, Wk 2: 1 tab twice a day, Wk 3: 2 tabs in AM, 1 tab in PM, Wk 4: 2 tabs twice a day, Maintenance dose: 2 tabs twice daily. 120 tablet 4   • PREMARIN 0.625 MG/GM vaginal cream      • PROAIR RESPICLICK 108 (90 BASE) MCG/ACT inhaler      • promethazine (PHENERGAN) 25 MG tablet Use PRN     • QUEtiapine (SEROquel) 200 MG tablet Take 1 tablet by mouth Every Night. 30 tablet 5   • ranitidine (ZANTAC) 75 MG/5ML syrup      • rOPINIRole (REQUIP) 0.25 MG tablet      • spironolactone (ALDACTONE) 50 MG tablet Take 2 tablets by mouth Daily. Start 1 tab daily for 1 month, increase to 1.5 tab daily for 2 weeks, then increase to 2 tabs daily. 60 tablet 11   • VENTOLIN  (90 BASE) MCG/ACT inhaler PRN     • XOLAIR 150 MG injection        No current facility-administered medications for this visit.        The following portions of the patient's history were reviewed and updated as appropriate: She  has a past medical history of Asthma; Basal cell carcinoma of skin; Bilateral ovarian cysts; Cervical spine disease;  Heart murmur; Migraine; and Parkinson disease.  She  has a past surgical history that includes Breast lumpectomy (Right, 2009); Rotator cuff repair (Right); and Finger surgery.  Her family history includes Arthritis in her mother; Cancer in her father and mother; Hypertension in her maternal grandfather and mother; Kidney disease in her paternal grandmother; Liver disease in her father; Osteoporosis in her maternal grandmother; Thyroid disease in her mother.  She  reports that she has never smoked. She has never used smokeless tobacco. She reports that she does not drink alcohol. Her drug history is not on file.  She is allergic to ciprofloxacin; latex; codeine sulfate; and pollen extract..      Objective:     Vitals:    07/14/17 1436   BP: 126/76   Pulse: 74   SpO2: 98%     Physical Exam   Constitutional: She is oriented to person, place, and time. She appears well-developed and well-nourished.   HENT:   Head: Normocephalic and atraumatic.   Eyes: Conjunctivae are normal.   Neck: No thyromegaly present.   The neck is supple and symmetric   Cardiovascular: Normal rate, regular rhythm and normal heart sounds.    Pulmonary/Chest: Effort normal and breath sounds normal.   Musculoskeletal: She exhibits no edema.   Lymphadenopathy:     She has no cervical adenopathy.   Neurological: She is alert and oriented to person, place, and time.   Skin: Skin is warm and dry. No rash noted.   hirsutism of the chin and cheeks, neck.    Psychiatric: She has a normal mood and affect. Thought content normal.   Vitals reviewed.      LABS AND IMAGING          Assessment:        Problem List Items Addressed This Visit        Endocrine    PCOS (polycystic ovarian syndrome) - Primary       Other    Insulin resistance               Plan:         Continue with the weight loss program and I have gone over different strategies to maintain low calorie Intake.   -cont Contrave  - for hirsutism  spironolactone  100 mg a day , Inhibitif solution  recommended.      Follow-up in 4 months

## 2017-08-04 RX ORDER — NALTREXONE HYDROCHLORIDE AND BUPROPION HYDROCHLORIDE 8; 90 MG/1; MG/1
TABLET, EXTENDED RELEASE ORAL
Qty: 120 TABLET | Refills: 3 | Status: SHIPPED | OUTPATIENT
Start: 2017-08-04 | End: 2018-05-02

## 2017-08-04 RX ORDER — LEVONORGESTREL AND ETHINYL ESTRADIOL 150-30(84)
KIT ORAL
Qty: 91 EACH | Refills: 0 | Status: SHIPPED | OUTPATIENT
Start: 2017-08-04 | End: 2017-12-22 | Stop reason: SDUPTHER

## 2017-09-07 RX ORDER — LEVONORGESTREL AND ETHINYL ESTRADIOL 150-30(84)
KIT ORAL
Qty: 91 EACH | Refills: 0 | Status: SHIPPED | OUTPATIENT
Start: 2017-09-07 | End: 2017-10-10 | Stop reason: SDUPTHER

## 2017-10-10 RX ORDER — LEVONORGESTREL AND ETHINYL ESTRADIOL 150-30(84)
KIT ORAL
Qty: 91 EACH | Refills: 2 | Status: SHIPPED | OUTPATIENT
Start: 2017-10-10 | End: 2018-05-02

## 2017-11-22 ENCOUNTER — TELEPHONE (OUTPATIENT)
Dept: INTERNAL MEDICINE | Facility: CLINIC | Age: 57
End: 2017-11-22

## 2017-11-22 NOTE — TELEPHONE ENCOUNTER
Returned pt's call and informed her that we can refill her Rx's until her scheduled appointment date. Pt stated she will call our office to let us know when she needs her refills (she currently still has 3 refills on each of her Rx's)

## 2017-11-22 NOTE — TELEPHONE ENCOUNTER
PATIENT HAD TO R/S HER APPT ON 12/1 TO [facility] 5/2, SHE WANTS TO KNOW IF SHE CAN STILL GET MED REFILLS

## 2017-12-22 ENCOUNTER — OFFICE VISIT (OUTPATIENT)
Dept: NEUROLOGY | Facility: CLINIC | Age: 57
End: 2017-12-22

## 2017-12-22 VITALS
DIASTOLIC BLOOD PRESSURE: 80 MMHG | BODY MASS INDEX: 32.07 KG/M2 | OXYGEN SATURATION: 98 % | HEIGHT: 63 IN | SYSTOLIC BLOOD PRESSURE: 124 MMHG | WEIGHT: 181 LBS | HEART RATE: 100 BPM

## 2017-12-22 DIAGNOSIS — G20 PARKINSON'S DISEASE (HCC): Primary | ICD-10-CM

## 2017-12-22 DIAGNOSIS — G47.01 INSOMNIA DUE TO MEDICAL CONDITION: ICD-10-CM

## 2017-12-22 PROCEDURE — 99214 OFFICE O/P EST MOD 30 MIN: CPT | Performed by: PSYCHIATRY & NEUROLOGY

## 2017-12-22 RX ORDER — MIRTAZAPINE 15 MG/1
1 TABLET, FILM COATED ORAL NIGHTLY
COMMUNITY
Start: 2017-12-07 | End: 2017-12-22

## 2017-12-22 RX ORDER — ROPINIROLE 0.25 MG/1
TABLET, FILM COATED ORAL
Status: CANCELLED | OUTPATIENT
Start: 2017-12-22

## 2017-12-22 RX ORDER — TRIHEXYPHENIDYL HYDROCHLORIDE 2 MG/1
1 TABLET ORAL 2 TIMES DAILY WITH MEALS
COMMUNITY
Start: 2017-11-14 | End: 2019-04-30

## 2017-12-22 RX ORDER — GLYCOPYRROLATE 2 MG/1
1 TABLET ORAL 2 TIMES DAILY
COMMUNITY
Start: 2017-12-08 | End: 2019-07-04 | Stop reason: HOSPADM

## 2017-12-22 RX ORDER — CHOLECALCIFEROL (VITAMIN D3) 125 MCG
30 CAPSULE ORAL NIGHTLY
COMMUNITY
End: 2019-02-08

## 2017-12-22 RX ORDER — MIRTAZAPINE 30 MG/1
30 TABLET, FILM COATED ORAL NIGHTLY
Qty: 30 TABLET | Refills: 3 | Status: SHIPPED | OUTPATIENT
Start: 2017-12-22 | End: 2018-05-06 | Stop reason: SDUPTHER

## 2017-12-22 NOTE — PROGRESS NOTES
[facility] NEUROLOGY [place] PROGRESS NOTE  History of Present Illness     Date: 12/22/2017    Patient Identification  Kelly Meza is a 57 y.o. female.    Patient information was obtained from patient.  History/Exam limitations: none.    Original consultation requested by: Dr. Solomon Li      Chief Complaint   Insomnia (Pt in office today for 6 month follow up. Pt states she is still having difficulty sleeping. Pt states Dr Garcia d/c'ed Seroquel 200mg due to concern it was causing tremors. Pt now taking Mirtazapine 15mg )      History of Present Illness   Patient is a delightful 57-year-old referred to [facility] neurology [place] for evaluation of insomnia.  Her Seroquel was discontinued.  Patient had difficulty with sleeping at night after the Seroquel was discontinued.   Patient is currently taking Mirtazapine 15 mg at bedtime without significant benefit.  Patient is unwilling to try higher dose of medication to achieve sleep.    PMH:   Past Medical History:   Diagnosis Date   • Asthma    • Basal cell carcinoma of skin    • Bilateral ovarian cysts    • Cervical spine disease    • Heart murmur    • Migraine    • Parkinson disease    • Parkinson's disease        Past Surgical History:   Past Surgical History:   Procedure Laterality Date   • BREAST LUMPECTOMY Right 2009   • FINGER SURGERY     • ROTATOR CUFF REPAIR Right        Family Hisotry:   Family History   Problem Relation Age of Onset   • Arthritis Mother    • Cancer Mother    • Hypertension Mother    • Thyroid disease Mother    • Cancer Father    • Liver disease Father    • Osteoporosis Maternal Grandmother    • Hypertension Maternal Grandfather    • Kidney disease Paternal Grandmother        Social History:   Social History     Social History   • Marital status:      Spouse name: N/A   • Number of children: N/A   • Years of education: N/A     Occupational History   • Not on file.     Social History Main Topics   • Smoking status:  Never Smoker   • Smokeless tobacco: Never Used   • Alcohol use No   • Drug use: Not on file   • Sexual activity: Not on file     Other Topics Concern   • Not on file     Social History Narrative       Medications:   Current Outpatient Prescriptions   Medication Sig Dispense Refill   • albuterol (PROVENTIL) (2.5 MG/3ML) 0.083% nebulizer solution      • carbidopa-levodopa (SINEMET)  MG per tablet Take 1 tablet by mouth 3 (Three) Times a Day. 90 tablet 6   • clonazePAM (KlonoPIN) 1 MG tablet      • CONTRAVE 8-90 MG tablet take 1 tablet by mouth once daily x 7 days, then twice daily x 7 days then 2 in the am and 1 at bedtime X 7 DAYS, then 2 twice daily 120 tablet 3   • DAYSEE 0.15-0.03 &0.01 MG tablet TAKE 1 TABLET BY MOUTH ONE TIME A DAY  91 each 2   • DULERA 200-5 MCG/ACT inhaler Use as directed     • glycopyrrolate (ROBINUL) 2 MG tablet 1 tablet 2 (Two) Times a Day.     • levonorgestrel-ethinyl estradiol (AVIANE,ALESSE,LESSINA) 0.1-20 MG-MCG per tablet 1 daily     • melatonin 5 MG tablet tablet Take 5 mg by mouth Every Night.     • mirtazapine (REMERON) 30 MG tablet 1 tablet Every Night. 30 tablet 3   • Multiple Vitamins-Minerals (MULTIVITAMIN ADULT PO) Take  by mouth.     • PREMARIN 0.625 MG/GM vaginal cream      • PROAIR RESPICLICK 108 (90 BASE) MCG/ACT inhaler      • promethazine (PHENERGAN) 25 MG tablet Use PRN     • ranitidine (ZANTAC) 75 MG/5ML syrup      • rOPINIRole (REQUIP) 0.25 MG tablet      • spironolactone (ALDACTONE) 50 MG tablet Take 2 tablets by mouth Daily. Start 1 tab daily for 1 month, increase to 1.5 tab daily for 2 weeks, then increase to 2 tabs daily. 60 tablet 11   • trihexyphenidyl (ARTANE) 2 MG tablet 1 tablet 3 (Three) Times a Day.     • VENTOLIN  (90 BASE) MCG/ACT inhaler PRN     • XOLAIR 150 MG injection        No current facility-administered medications for this visit.        Allergy:   Allergies   Allergen Reactions   • Ciprofloxacin Anaphylaxis   • Latex Hives and  "Shortness Of Breath   • Codeine Sulfate Nausea And Vomiting   • Pollen Extract        Review of Systems:  Review of Systems   Constitutional: Positive for fatigue. Negative for chills and fever.   HENT: Negative for congestion, ear pain, hearing loss, rhinorrhea and sore throat.    Eyes: Negative for pain, discharge and redness.   Respiratory: Negative for cough, shortness of breath, wheezing and stridor.    Cardiovascular: Negative for chest pain, palpitations and leg swelling.   Gastrointestinal: Negative for abdominal pain, constipation, nausea and vomiting.   Endocrine: Negative for cold intolerance, heat intolerance and polyphagia.   Genitourinary: Negative for dysuria, flank pain, frequency and urgency.   Musculoskeletal: Negative for joint swelling, myalgias, neck pain and neck stiffness.   Skin: Negative for pallor, rash and wound.   Allergic/Immunologic: Negative for environmental allergies.   Neurological: Positive for tremors. Negative for dizziness, seizures, syncope, facial asymmetry, speech difficulty, weakness, light-headedness, numbness and headaches.   Hematological: Negative for adenopathy.   Psychiatric/Behavioral: Positive for sleep disturbance. Negative for confusion and hallucinations. The patient is not nervous/anxious.        Physical Exam     Vitals:    12/22/17 1058   BP: 124/80   Pulse: 100   SpO2: 98%   Weight: 82.1 kg (181 lb)   Height: 160 cm (63\")     GENERAL: Patient is pleasant, cooperative, appears to be stated age.  Body habitus is endomorphic.  SKIN AND EXTREMITIES:  No skin rashes or lesions are noted.  No cyanosis, clubbing or edema of the extremities.    HEAD:  Head is normocephalic and atraumatic.    NECK: Neck are non-tender without thyromegaly or adenopathy.  Carotic upstrokes are 1+/4.  No cranial or cervical bruits.  The neck is supple with a full range of motion.   ENT: palate elevate symmetrically, no evidence of high arch palate, tongue midline erythema in posterior " pharynx, Mallampati Classification Class III   CARDIOVASCULAR:  Regular rate and rhythm with normal S1 and S2 without rub or gallop.  RESPIRATORY:  Clear to auscultation without wheezes or crackle   ABDOMEN:  Soft and non-tender, positive bowel sound without hepatosplenomegaly  BACK:  Back is straight without midline defect.    PSYCH:  Higher cortical function/mental status:  The patient is alert.  She is oriented x3 to time, place and person.  Recent and the remote memory appear normal.  The patient has a good fund of knowledge.  There is no visual or auditory hallucination or suicidal or homicidal ideation.  SPEECH:There is no gross evidence of aphasia, dysarthria or agnosia.      CRANIAL NERVES:  Pupils are 4mm, equal round reactive to light, reacting briskly to 2mm without afferent pupillary defect.  Visual fields are intact to confrontation testing.  Fundoscopic examination reveals sharp disk margins with normal vasculature.  No papilledema, hemorrhages or exudates.  Extraocular movements are full and smooth with normal pursuits and saccades.  No nystagmus noted.  The face is symmetric. palate elevate symmetrically, Tongue midline, positive gag reflex. The remainder of the cranial nerves are intact and symmetrical.    MOTOR: Strength is 5/5 throughout with normal tone and bulk with the following exceptions, 4/5 intrinsic muscles of the hands and feet.  No involuntary movements noted.    Deep Tendon Reflexes: are 2/4 and symmetrical in the upper extremities, 2/4 and symmetrical at the knees and 1/4 and symmetrical at the Achilles tendon.  Plantar responses were down-going bilaterally.    SENSATION:  Intact to pinprick, light touch, vibration and proprioception.  Coordination:  The patient normally performs finger-nose-finger, heel-to-knee-to-shin and rapid alternating movements in symmetrical fashion.    COORDINATION AND GAIT:  The patient walks with a narrow-based gait.  Patient is able to heel-toe and tandem  walk forward and backwards without difficulty.  Romberg and monopedal  Romberg are negative.    MUSCULOSKELETAL: Range of motion normal, no clubbing, cyanosis, or edema.  No joint swelling.              Records Reviewed: I have personally reviewed her previous medical record.    Kelly was seen today for insomnia.    Diagnoses and all orders for this visit:    Parkinson's disease    Insomnia due to medical condition    Other orders  -     Cancel: rOPINIRole (REQUIP) 0.25 MG tablet;   -     carbidopa-levodopa (SINEMET)  MG per tablet; Take 1 tablet by mouth 3 (Three) Times a Day.  -     mirtazapine (REMERON) 30 MG tablet; 1 tablet Every Night.        Discussion:  1.  Continue patient on Sinemet 25/100 one pill 3 times a day  2.  Counseled patient extensively on Parkinson disease as follow  I have counseled patient extensively on the pathophysiology of Parkinson disease and its treatment.  Treatment for Parkinson's Disease (PD), due to its chronic nature, requires broad-based management including patient and family education, support group services, general wellness maintenance, exercise, and nutrition. At present, there is no cure for PD, but medications or surgery can provide relief from the symptoms.  The main families of drugs useful for treating motor symptoms are Levodopa, dopamine agonists  , COMT inhibitors and MAO-B inhibitors.  The most commonly used treatment approach varies depending on the disease stage.  Two phases are usually distinguished: an initial phase in which the individual with PD has already developed some disability for which he needs pharmacological treatment, and a second stage in which the patient develops motor complications related to Dopaminergic medication usage. Treatment in the initial state aims to attain an optimal tradeoff between improvement of PD symptoms and side-effects resulting from medications. The start of L-DOPA treatment may be delayed by using other medications such  as MAO-B inhibitors and dopamine agonists, in the hope of causing the onset of dyskinesias to be retarded.  In the second stage the aim is to reduce symptoms while controlling fluctuations of the response to medication. When medications are not enough to control symptoms, surgical techniques such as deep brain stimulation can relieve the associated movement disorders.      Regular physical exercise can be beneficial to maintain and improve mobility, flexibility, strength, gait speed, and quality of life.  Exercise may also improve constipation. Exercise interventions have been shown to benefit patients with Parkinson's disease in regards to physical functioning, health-related quality of life, balance and fall risk  3.  Encourage regular sleep wake scheduled  4.  Avoid sleep deprivation  5.  Counseled patient on good sleep hygiene  6.  Increase mMirtazapine to 30 mg at nighttime  This Document is signed by Александр Burns MD, FAAN, FAASM   December 22, 201711:31 PM

## 2018-01-02 ENCOUNTER — TRANSCRIBE ORDERS (OUTPATIENT)
Dept: LAB | Facility: HOSPITAL | Age: 58
End: 2018-01-02

## 2018-01-02 ENCOUNTER — LAB (OUTPATIENT)
Dept: LAB | Facility: HOSPITAL | Age: 58
End: 2018-01-02
Attending: INTERNAL MEDICINE

## 2018-01-02 ENCOUNTER — TRANSCRIBE ORDERS (OUTPATIENT)
Dept: ADMINISTRATIVE | Facility: HOSPITAL | Age: 58
End: 2018-01-02

## 2018-01-02 DIAGNOSIS — I10 ESSENTIAL HYPERTENSION, MALIGNANT: ICD-10-CM

## 2018-01-02 DIAGNOSIS — Z11.59 SCREENING EXAMINATION FOR POLIOMYELITIS: ICD-10-CM

## 2018-01-02 DIAGNOSIS — R73.9 BLOOD GLUCOSE ELEVATED: Primary | ICD-10-CM

## 2018-01-02 DIAGNOSIS — R73.9 BLOOD GLUCOSE ELEVATED: ICD-10-CM

## 2018-01-02 DIAGNOSIS — Z12.39 SCREENING BREAST EXAMINATION: Primary | ICD-10-CM

## 2018-01-02 LAB
ALBUMIN SERPL-MCNC: 4.5 G/DL (ref 3.5–5)
ALBUMIN/GLOB SERPL: 1.5 G/DL (ref 1–2)
ALP SERPL-CCNC: 104 U/L (ref 38–126)
ALT SERPL W P-5'-P-CCNC: 35 U/L (ref 13–69)
ANION GAP SERPL CALCULATED.3IONS-SCNC: 15.3 MMOL/L
AST SERPL-CCNC: 22 U/L (ref 15–46)
BASOPHILS # BLD AUTO: 0.07 10*3/MM3 (ref 0–0.2)
BASOPHILS NFR BLD AUTO: 1.1 % (ref 0–2.5)
BILIRUB SERPL-MCNC: 0.2 MG/DL (ref 0.2–1.3)
BUN BLD-MCNC: 19 MG/DL (ref 7–20)
BUN/CREAT SERPL: 14.6 (ref 7.1–23.5)
CALCIUM SPEC-SCNC: 9.5 MG/DL (ref 8.4–10.2)
CHLORIDE SERPL-SCNC: 106 MMOL/L (ref 98–107)
CHOLEST SERPL-MCNC: 216 MG/DL (ref 0–199)
CO2 SERPL-SCNC: 25 MMOL/L (ref 26–30)
CREAT BLD-MCNC: 1.3 MG/DL (ref 0.6–1.3)
DEPRECATED RDW RBC AUTO: 41.2 FL (ref 37–54)
EOSINOPHIL # BLD AUTO: 0.34 10*3/MM3 (ref 0–0.7)
EOSINOPHIL NFR BLD AUTO: 5.3 % (ref 0–7)
ERYTHROCYTE [DISTWIDTH] IN BLOOD BY AUTOMATED COUNT: 13.8 % (ref 11.5–14.5)
GFR SERPL CREATININE-BSD FRML MDRD: 42 ML/MIN/1.73
GLOBULIN UR ELPH-MCNC: 3 GM/DL
GLUCOSE BLD-MCNC: 105 MG/DL (ref 74–98)
HBA1C MFR BLD: 5.9 % (ref 3–6)
HCT VFR BLD AUTO: 38.4 % (ref 37–47)
HDLC SERPL-MCNC: 61 MG/DL (ref 40–60)
HGB BLD-MCNC: 12 G/DL (ref 12–16)
IMM GRANULOCYTES # BLD: 0.01 10*3/MM3 (ref 0–0.06)
IMM GRANULOCYTES NFR BLD: 0.2 % (ref 0–0.6)
LDLC SERPL CALC-MCNC: 127 MG/DL (ref 0–99)
LDLC/HDLC SERPL: 2.08 {RATIO}
LYMPHOCYTES # BLD AUTO: 1.52 10*3/MM3 (ref 0.6–3.4)
LYMPHOCYTES NFR BLD AUTO: 23.8 % (ref 10–50)
MCH RBC QN AUTO: 25.9 PG (ref 27–31)
MCHC RBC AUTO-ENTMCNC: 31.3 G/DL (ref 30–37)
MCV RBC AUTO: 82.8 FL (ref 81–99)
MONOCYTES # BLD AUTO: 0.54 10*3/MM3 (ref 0–0.9)
MONOCYTES NFR BLD AUTO: 8.5 % (ref 0–12)
NEUTROPHILS # BLD AUTO: 3.91 10*3/MM3 (ref 2–6.9)
NEUTROPHILS NFR BLD AUTO: 61.1 % (ref 37–80)
NRBC BLD MANUAL-RTO: 0 /100 WBC (ref 0–0)
PLATELET # BLD AUTO: 200 10*3/MM3 (ref 130–400)
PMV BLD AUTO: 12.8 FL (ref 6–12)
POTASSIUM BLD-SCNC: 4.3 MMOL/L (ref 3.5–5.1)
PROT SERPL-MCNC: 7.5 G/DL (ref 6.3–8.2)
RBC # BLD AUTO: 4.64 10*6/MM3 (ref 4.2–5.4)
SODIUM BLD-SCNC: 142 MMOL/L (ref 137–145)
TRIGL SERPL-MCNC: 141 MG/DL
VLDLC SERPL-MCNC: 28.2 MG/DL
WBC NRBC COR # BLD: 6.39 10*3/MM3 (ref 4.8–10.8)

## 2018-01-02 PROCEDURE — 85025 COMPLETE CBC W/AUTO DIFF WBC: CPT

## 2018-01-02 PROCEDURE — 86803 HEPATITIS C AB TEST: CPT

## 2018-01-02 PROCEDURE — 80053 COMPREHEN METABOLIC PANEL: CPT

## 2018-01-02 PROCEDURE — 83036 HEMOGLOBIN GLYCOSYLATED A1C: CPT

## 2018-01-02 PROCEDURE — 80061 LIPID PANEL: CPT

## 2018-01-02 PROCEDURE — 36415 COLL VENOUS BLD VENIPUNCTURE: CPT

## 2018-01-03 LAB — HCV AB S/CO SERPL IA: <0.1 S/CO RATIO (ref 0–0.9)

## 2018-01-16 RX ORDER — SPIRONOLACTONE 50 MG/1
TABLET, FILM COATED ORAL
Qty: 60 TABLET | Refills: 10 | Status: SHIPPED | OUTPATIENT
Start: 2018-01-16 | End: 2018-05-02

## 2018-01-25 ENCOUNTER — APPOINTMENT (OUTPATIENT)
Dept: MAMMOGRAPHY | Facility: HOSPITAL | Age: 58
End: 2018-01-25
Attending: INTERNAL MEDICINE

## 2018-02-06 ENCOUNTER — CONSULT (OUTPATIENT)
Dept: CARDIOLOGY | Facility: CLINIC | Age: 58
End: 2018-02-06

## 2018-02-06 VITALS
RESPIRATION RATE: 16 BRPM | HEART RATE: 88 BPM | HEIGHT: 63 IN | OXYGEN SATURATION: 98 % | WEIGHT: 184 LBS | SYSTOLIC BLOOD PRESSURE: 126 MMHG | DIASTOLIC BLOOD PRESSURE: 80 MMHG | BODY MASS INDEX: 32.6 KG/M2

## 2018-02-06 DIAGNOSIS — R07.2 PRECORDIAL PAIN: ICD-10-CM

## 2018-02-06 DIAGNOSIS — R06.02 SHORTNESS OF BREATH: Primary | ICD-10-CM

## 2018-02-06 LAB
BH CV ECHO MEAS - % IVS THICK: 47.4 %
BH CV ECHO MEAS - % LVPW THICK: 41.2 %
BH CV ECHO MEAS - AO MAX PG (FULL): 0.98 MMHG
BH CV ECHO MEAS - AO MAX PG: 6 MMHG
BH CV ECHO MEAS - AO MEAN PG (FULL): 1 MMHG
BH CV ECHO MEAS - AO MEAN PG: 3 MMHG
BH CV ECHO MEAS - AO ROOT AREA (BSA CORRECTED): 1.4
BH CV ECHO MEAS - AO ROOT AREA: 5.3 CM^2
BH CV ECHO MEAS - AO ROOT DIAM: 2.6 CM
BH CV ECHO MEAS - AO V2 MAX: 124.5 CM/SEC
BH CV ECHO MEAS - AO V2 MEAN: 82.2 CM/SEC
BH CV ECHO MEAS - AO V2 VTI: 20.8 CM
BH CV ECHO MEAS - AVA(I,A): 2.6 CM^2
BH CV ECHO MEAS - AVA(I,D): 2.6 CM^2
BH CV ECHO MEAS - AVA(V,A): 2.8 CM^2
BH CV ECHO MEAS - AVA(V,D): 2.8 CM^2
BH CV ECHO MEAS - BSA(HAYCOCK): 1.9 M^2
BH CV ECHO MEAS - BSA: 1.8 M^2
BH CV ECHO MEAS - BZI_BMI: 33.1 KILOGRAMS/M^2
BH CV ECHO MEAS - BZI_METRIC_HEIGHT: 157.5 CM
BH CV ECHO MEAS - BZI_METRIC_WEIGHT: 82.1 KG
BH CV ECHO MEAS - CONTRAST EF 4CH: 59.2 ML/M^2
BH CV ECHO MEAS - EDV(CUBED): 82.3 ML
BH CV ECHO MEAS - EDV(MOD-SP4): 103 ML
BH CV ECHO MEAS - EDV(TEICH): 85.4 ML
BH CV ECHO MEAS - EF(CUBED): 65.5 %
BH CV ECHO MEAS - EF(MOD-SP4): 59.2 %
BH CV ECHO MEAS - EF(TEICH): 57.3 %
BH CV ECHO MEAS - ESV(CUBED): 28.4 ML
BH CV ECHO MEAS - ESV(MOD-SP4): 42 ML
BH CV ECHO MEAS - ESV(TEICH): 36.4 ML
BH CV ECHO MEAS - FS: 29.9 %
BH CV ECHO MEAS - IVS/LVPW: 1.1
BH CV ECHO MEAS - IVSD: 0.95 CM
BH CV ECHO MEAS - IVSS: 1.4 CM
BH CV ECHO MEAS - LA DIMENSION: 3.2 CM
BH CV ECHO MEAS - LA/AO: 1.2
BH CV ECHO MEAS - LV DIASTOLIC VOL/BSA (35-75): 56.2 ML/M^2
BH CV ECHO MEAS - LV IVRT: 0.14 SEC
BH CV ECHO MEAS - LV MASS(C)D: 125.6 GRAMS
BH CV ECHO MEAS - LV MASS(C)DI: 68.6 GRAMS/M^2
BH CV ECHO MEAS - LV MASS(C)S: 127.1 GRAMS
BH CV ECHO MEAS - LV MASS(C)SI: 69.3 GRAMS/M^2
BH CV ECHO MEAS - LV MAX PG: 5 MMHG
BH CV ECHO MEAS - LV MEAN PG: 2 MMHG
BH CV ECHO MEAS - LV SYSTOLIC VOL/BSA (12-30): 22.9 ML/M^2
BH CV ECHO MEAS - LV V1 MAX: 112 CM/SEC
BH CV ECHO MEAS - LV V1 MEAN: 66.6 CM/SEC
BH CV ECHO MEAS - LV V1 VTI: 17.3 CM
BH CV ECHO MEAS - LVIDD: 4.4 CM
BH CV ECHO MEAS - LVIDS: 3.1 CM
BH CV ECHO MEAS - LVLD AP4: 7.5 CM
BH CV ECHO MEAS - LVLS AP4: 6.6 CM
BH CV ECHO MEAS - LVOT AREA (M): 3.1 CM^2
BH CV ECHO MEAS - LVOT AREA: 3.1 CM^2
BH CV ECHO MEAS - LVOT DIAM: 2 CM
BH CV ECHO MEAS - LVPWD: 0.85 CM
BH CV ECHO MEAS - LVPWS: 1.2 CM
BH CV ECHO MEAS - MV A MAX VEL: 96.9 CM/SEC
BH CV ECHO MEAS - MV DEC SLOPE: 329.5 CM/SEC^2
BH CV ECHO MEAS - MV DEC TIME: 0.22 SEC
BH CV ECHO MEAS - MV E MAX VEL: 70.1 CM/SEC
BH CV ECHO MEAS - MV E/A: 0.72
BH CV ECHO MEAS - MV P1/2T MAX VEL: 68.8 CM/SEC
BH CV ECHO MEAS - MV P1/2T: 61.2 MSEC
BH CV ECHO MEAS - MVA P1/2T LCG: 3.2 CM^2
BH CV ECHO MEAS - MVA(P1/2T): 3.6 CM^2
BH CV ECHO MEAS - PA MAX PG: 2.2 MMHG
BH CV ECHO MEAS - PA V2 MAX: 74.3 CM/SEC
BH CV ECHO MEAS - RAP SYSTOLE: 10 MMHG
BH CV ECHO MEAS - RVSP: 20 MMHG
BH CV ECHO MEAS - SI(AO): 60.3 ML/M^2
BH CV ECHO MEAS - SI(CUBED): 29.4 ML/M^2
BH CV ECHO MEAS - SI(LVOT): 29.7 ML/M^2
BH CV ECHO MEAS - SI(MOD-SP4): 33.3 ML/M^2
BH CV ECHO MEAS - SI(TEICH): 26.7 ML/M^2
BH CV ECHO MEAS - SV(AO): 110.4 ML
BH CV ECHO MEAS - SV(CUBED): 53.9 ML
BH CV ECHO MEAS - SV(LVOT): 54.3 ML
BH CV ECHO MEAS - SV(MOD-SP4): 61 ML
BH CV ECHO MEAS - SV(TEICH): 48.9 ML
BH CV ECHO MEAS - TR MAX VEL: 161.5 CM/SEC
BH CV ECHO MEAS - TV MAX PG: 1.4 MMHG
BH CV ECHO MEAS - TV V2 MAX: 59.8 CM/SEC
LV EF 2D ECHO EST: 60 %
MAXIMAL PREDICTED HEART RATE: 164 BPM
STRESS TARGET HR: 139 BPM

## 2018-02-06 PROCEDURE — 93000 ELECTROCARDIOGRAM COMPLETE: CPT | Performed by: INTERNAL MEDICINE

## 2018-02-06 PROCEDURE — 99204 OFFICE O/P NEW MOD 45 MIN: CPT | Performed by: INTERNAL MEDICINE

## 2018-02-06 NOTE — PROGRESS NOTES
Subjective:     Encounter Date:02/06/2018      Patient ID: Kelly Meza is a 57 y.o. female.    Chief Complaint:Chest pain and shortness of breath  HPI  This is a 57-year-old female patient who has been experiencing chest discomfort since Sunday.  She reports having a sharp stabbing pain along her right scapula.  The discomfort is worse when she coughs or takes a deep breath.  The discomfort is very intense but only last for a few seconds.  The discomfort has a 10 over 10 in intensity.  It also is extremely painful to sneeze.  There is some associated shortness of breath.  The discomfort does not radiate.  The frequency has been variable.  She cannot identify any precipitating aggravating or alleviating features.  The patient has also demonstrated shortness of breath with activity.  She indicates that if she climbs one flight of stairs or does as much as 20 sit-ups she will experience severe shortness of breath.  Has been progressively worsening over the last several months.  She first noticed shortness of breath with activity approximately one year ago.  She has had no dizziness palpitations or syncope.  There is no orthopnea PND or lower extremity edema.  She has no personal history of hypertension diabetes or hypercholesterolemia.  She is a current nonsmoker.  She discontinued cigarette smoking in 1986.  She has been evaluated by both the pulmonologist as well as the your nose and throat specialist who found no etiology to her shortness of breath.  Her family history is remarkable for coronary artery disease.  The following portions of the patient's history were reviewed and updated as appropriate: allergies, current medications, past family history, past medical history, past social history, past surgical history and problem  Review of Systems   Constitution: Negative for chills, diaphoresis, fever, weakness, malaise/fatigue, night sweats, weight gain and weight loss.   HENT: Negative for ear  discharge, hearing loss, hoarse voice and nosebleeds.    Eyes: Negative for discharge, double vision, pain and photophobia.   Cardiovascular: Positive for chest pain and dyspnea on exertion. Negative for claudication, cyanosis, irregular heartbeat, leg swelling, near-syncope, orthopnea, palpitations, paroxysmal nocturnal dyspnea and syncope.   Respiratory: Negative for cough, hemoptysis, shortness of breath, sputum production and wheezing.    Endocrine: Negative for cold intolerance, heat intolerance, polydipsia, polyphagia and polyuria.   Hematologic/Lymphatic: Negative for adenopathy and bleeding problem. Does not bruise/bleed easily.   Skin: Negative for color change, flushing, itching and rash.   Musculoskeletal: Positive for back pain. Negative for muscle cramps, muscle weakness, myalgias and stiffness.   Gastrointestinal: Negative for abdominal pain, diarrhea, hematemesis, hematochezia, nausea and vomiting.   Genitourinary: Negative for dysuria, frequency and nocturia.   Neurological: Negative for focal weakness, loss of balance, numbness, paresthesias and seizures.   Psychiatric/Behavioral: Negative for altered mental status, hallucinations and suicidal ideas.   Allergic/Immunologic: Negative for HIV exposure, hives and persistent infections.       Current Outpatient Prescriptions:   •  albuterol (PROVENTIL) (2.5 MG/3ML) 0.083% nebulizer solution, , Disp: , Rfl:   •  carbidopa-levodopa (SINEMET)  MG per tablet, Take 1 tablet by mouth 3 (Three) Times a Day., Disp: 90 tablet, Rfl: 6  •  clonazePAM (KlonoPIN) 1 MG tablet, Take 1 mg by mouth At Night As Needed., Disp: , Rfl:   •  CONTRAVE 8-90 MG tablet, take 1 tablet by mouth once daily x 7 days, then twice daily x 7 days then 2 in the am and 1 at bedtime X 7 DAYS, then 2 twice daily, Disp: 120 tablet, Rfl: 3  •  DAYSEE 0.15-0.03 &0.01 MG tablet, TAKE 1 TABLET BY MOUTH ONE TIME A DAY , Disp: 91 each, Rfl: 2  •  DULERA 200-5 MCG/ACT inhaler, Use as  directed, Disp: , Rfl:   •  glycopyrrolate (ROBINUL) 2 MG tablet, 1 tablet 2 (Two) Times a Day., Disp: , Rfl:   •  levonorgestrel-ethinyl estradiol (AVIANE,ALESSE,LESSINA) 0.1-20 MG-MCG per tablet, 1 daily, Disp: , Rfl:   •  melatonin 5 MG tablet tablet, Take 30 mg by mouth Every Night., Disp: , Rfl:   •  mirtazapine (REMERON) 30 MG tablet, 1 tablet Every Night., Disp: 30 tablet, Rfl: 3  •  Multiple Vitamins-Minerals (MULTIVITAMIN ADULT PO), Take  by mouth., Disp: , Rfl:   •  PREMARIN 0.625 MG/GM vaginal cream, , Disp: , Rfl:   •  PROAIR RESPICLICK 108 (90 BASE) MCG/ACT inhaler, , Disp: , Rfl:   •  promethazine (PHENERGAN) 25 MG tablet, Use PRN, Disp: , Rfl:   •  ranitidine (ZANTAC) 75 MG/5ML syrup, Take 75 mg by mouth 2 (Two) Times a Day., Disp: , Rfl:   •  rOPINIRole (REQUIP) 0.25 MG tablet, , Disp: , Rfl:   •  spironolactone (ALDACTONE) 50 MG tablet, TAKE 1 TABLET BY MOUTH ONCE DAILY FOR ONE MONTH, THEN INCREASE TO 1.5 TABLETS ONCE DAILY FOR 2 WEEKS, THEN 2 TABLETS ONCE DAILY THEREAFTER  (Patient taking differently: TAKE  2 TABLETS ONCE DAILY), Disp: 60 tablet, Rfl: 10  •  trihexyphenidyl (ARTANE) 2 MG tablet, 1 tablet 3 (Three) Times a Day., Disp: , Rfl:   •  VENTOLIN  (90 BASE) MCG/ACT inhaler, PRN, Disp: , Rfl:   •  XOLAIR 150 MG injection, , Disp: , Rfl:      Objective:     Physical Exam   Constitutional: She is oriented to person, place, and time. She appears well-developed and well-nourished.   HENT:   Head: Normocephalic and atraumatic.   Mouth/Throat: Oropharynx is clear and moist.   Eyes: Conjunctivae and EOM are normal. Pupils are equal, round, and reactive to light. No scleral icterus.   Neck: Normal range of motion. Neck supple. No JVD present. No tracheal deviation present. No thyromegaly present.   Cardiovascular: Normal rate, regular rhythm, S1 normal, S2 normal, normal heart sounds, intact distal pulses and normal pulses.  PMI is not displaced.  Exam reveals no gallop and no friction rub.   "  No murmur heard.  Pulmonary/Chest: Effort normal and breath sounds normal. No respiratory distress. She has no wheezes. She has no rales.   Abdominal: Soft. Bowel sounds are normal. She exhibits no distension and no mass. There is no tenderness. There is no rebound and no guarding.   Musculoskeletal: Normal range of motion. She exhibits no edema or deformity.   Neurological: She is alert and oriented to person, place, and time. She displays normal reflexes. No cranial nerve deficit. Coordination normal.   Skin: Skin is warm and dry. No rash noted. No erythema.   Psychiatric: She has a normal mood and affect. Her behavior is normal. Thought content normal.     Blood pressure 126/80, pulse 88, resp. rate 16, height 160 cm (62.99\"), weight 83.5 kg (184 lb), SpO2 98 %.   Lab Review:       Assessment:         1. Shortness of breath  I suspect this is multifactorial in etiology.  Some could be related to obesity and aerobic deconditioning.  This could also represent an angina equivalent.  There may be an element of unrecognized congestive heart failure or valvular heart disease.  - ECG 12 Lead  - Stress Test With Myocardial Perfusion Two Day  - Adult Transthoracic Echo Complete W/ Cont if Necessary Per Protocol    2. Precordial pain  The patient's chest discomfort has features both typical and atypical for coronary insufficiency.  The patient has little in the way of risk factors for premature coronary disease.  She has never had a stress test.  She is unable to do treadmill exercise stress testing due to back pain, obesity and aerobic deconditioning.  - Stress Test With Myocardial Perfusion Two Day  - Adult Transthoracic Echo Complete W/ Cont if Necessary Per Protocol    ECG 12 Lead  Date/Time: 2/6/2018 10:22 AM  Performed by: JÚNIOR CRISOSTOMO  Authorized by: JÚNIOR CRISOSTOMO   Rhythm: sinus rhythm  Rate: normal  QRS axis: normal  Clinical impression: abnormal ECG  Comments: Nonspecific ST-T wave changes.  Decreased " voltage in the right precordial leads.             Plan:       I have recommended a vasodilator nuclear stress test as well as an echocardiogram.  No changes in her medication therapy have been made at today's visit.  Further recommendations will be predicated on the results of her outpatient testing.  The importance of diet exercise and weight management have been reinforced with the patient.

## 2018-02-23 ENCOUNTER — HOSPITAL ENCOUNTER (OUTPATIENT)
Dept: MAMMOGRAPHY | Facility: HOSPITAL | Age: 58
Discharge: HOME | End: 2018-02-23
Attending: INTERNAL MEDICINE | Admitting: INTERNAL MEDICINE

## 2018-02-23 DIAGNOSIS — Z12.39 SCREENING BREAST EXAMINATION: ICD-10-CM

## 2018-02-23 PROCEDURE — 77063 BREAST TOMOSYNTHESIS BI: CPT

## 2018-02-23 PROCEDURE — 77067 SCR MAMMO BI INCL CAD: CPT

## 2018-04-02 ENCOUNTER — HOSPITAL ENCOUNTER (OUTPATIENT)
Dept: NUCLEAR MEDICINE | Facility: HOSPITAL | Age: 58
Discharge: HOME | End: 2018-04-02
Attending: INTERNAL MEDICINE

## 2018-04-02 PROCEDURE — 93018 CV STRESS TEST I&R ONLY: CPT | Performed by: INTERNAL MEDICINE

## 2018-04-02 PROCEDURE — A9500 TC99M SESTAMIBI: HCPCS | Performed by: INTERNAL MEDICINE

## 2018-04-02 PROCEDURE — 78452 HT MUSCLE IMAGE SPECT MULT: CPT

## 2018-04-02 PROCEDURE — 25010000002 REGADENOSON 0.4 MG/5ML SOLUTION: Performed by: INTERNAL MEDICINE

## 2018-04-02 PROCEDURE — 0 TECHNETIUM SESTAMIBI: Performed by: INTERNAL MEDICINE

## 2018-04-02 PROCEDURE — 78452 HT MUSCLE IMAGE SPECT MULT: CPT | Performed by: INTERNAL MEDICINE

## 2018-04-02 PROCEDURE — 93017 CV STRESS TEST TRACING ONLY: CPT

## 2018-04-02 RX ADMIN — REGADENOSON 0.4 MG: 0.08 INJECTION, SOLUTION INTRAVENOUS at 07:45

## 2018-04-02 RX ADMIN — TECHNETIUM TC 99M SESTAMIBI 1 DOSE: 1 INJECTION INTRAVENOUS at 07:40

## 2018-04-03 ENCOUNTER — HOSPITAL ENCOUNTER (OUTPATIENT)
Dept: NUCLEAR MEDICINE | Facility: HOSPITAL | Age: 58
Discharge: HOME | End: 2018-04-03
Attending: INTERNAL MEDICINE

## 2018-04-03 PROCEDURE — A9500 TC99M SESTAMIBI: HCPCS | Performed by: INTERNAL MEDICINE

## 2018-04-03 PROCEDURE — 0 TECHNETIUM SESTAMIBI: Performed by: INTERNAL MEDICINE

## 2018-04-03 RX ADMIN — TECHNETIUM TC 99M SESTAMIBI 1 DOSE: 1 INJECTION INTRAVENOUS at 06:10

## 2018-04-04 LAB
BH CV NUCLEAR PRIOR STUDY: 3
BH CV STRESS COMMENTS STAGE 1: NORMAL
BH CV STRESS DOSE REGADENOSON STAGE 1: 0.4
BH CV STRESS DURATION MIN STAGE 1: 0
BH CV STRESS DURATION SEC STAGE 1: 10
BH CV STRESS PROTOCOL 1: NORMAL
BH CV STRESS RECOVERY BP: NORMAL MMHG
BH CV STRESS RECOVERY HR: 93 BPM
BH CV STRESS STAGE 1: 1
LV EF NUC BP: 63 %
MAXIMAL PREDICTED HEART RATE: 163 BPM
PERCENT MAX PREDICTED HR: 60.74 %
STRESS BASELINE BP: NORMAL MMHG
STRESS BASELINE HR: 78 BPM
STRESS PERCENT HR: 71 %
STRESS POST PEAK BP: NORMAL MMHG
STRESS POST PEAK HR: 99 BPM
STRESS TARGET HR: 139 BPM

## 2018-04-09 ENCOUNTER — OFFICE VISIT (OUTPATIENT)
Dept: CARDIOLOGY | Facility: CLINIC | Age: 58
End: 2018-04-09

## 2018-04-09 VITALS
SYSTOLIC BLOOD PRESSURE: 118 MMHG | DIASTOLIC BLOOD PRESSURE: 81 MMHG | HEIGHT: 63 IN | OXYGEN SATURATION: 96 % | WEIGHT: 184 LBS | BODY MASS INDEX: 32.6 KG/M2 | HEART RATE: 83 BPM

## 2018-04-09 DIAGNOSIS — R06.02 SHORTNESS OF BREATH: Primary | ICD-10-CM

## 2018-04-09 PROCEDURE — 99213 OFFICE O/P EST LOW 20 MIN: CPT | Performed by: INTERNAL MEDICINE

## 2018-04-09 NOTE — PROGRESS NOTES
Subjective:     Encounter Date:04/09/2018      Patient ID: Kelly Meza is a 58 y.o. female.    Chief Complaint: Shortness of breath  HPI  This is a 58-year-old female patient who reports shortness of breath both at rest and with activity.  The patient underwent a vasodilator nuclear stress test which showed no evidence of ischemia or infarction.  The calculated ejection fraction was normal.  The patient underwent an echocardiogram which showed normal cardiac chamber dimensions muscle thickness.  Left ventricular systolic and diastolic function was normal.  There was a normal ejection fraction and no regional wall motion abnormalities.  Estimates of left and right filling filling pressures were normal.  There were no valvular abnormalities, pericardial disease, pulmonary hypertension or intracardiac shunting.  The patient has no chest discomfort at rest or with activity.  There is no orthopnea PND or lower extremity edema.  There is no dizziness palpitations or syncope.  She remains a nonsmoker.  The following portions of the patient's history were reviewed and updated as appropriate: allergies, current medications, past family history, past medical history, past social history, past surgical history and problem  Review of Systems   Constitution: Negative for chills, diaphoresis, fever, weakness, malaise/fatigue, weight gain and weight loss.   HENT: Negative for ear discharge, hearing loss, hoarse voice and nosebleeds.    Eyes: Negative for discharge, double vision, pain and photophobia.   Cardiovascular: Positive for dyspnea on exertion. Negative for chest pain, claudication, cyanosis, irregular heartbeat, leg swelling, near-syncope, orthopnea, palpitations, paroxysmal nocturnal dyspnea and syncope.   Respiratory: Positive for shortness of breath. Negative for cough, hemoptysis, sputum production and wheezing.    Endocrine: Negative for cold intolerance, heat intolerance, polydipsia, polyphagia and  polyuria.   Hematologic/Lymphatic: Negative for adenopathy and bleeding problem. Does not bruise/bleed easily.   Skin: Negative for color change, flushing, itching and rash.   Musculoskeletal: Negative for muscle cramps, muscle weakness, myalgias and stiffness.   Gastrointestinal: Negative for abdominal pain, diarrhea, hematemesis, hematochezia, nausea and vomiting.   Genitourinary: Negative for dysuria, frequency and nocturia.   Neurological: Negative for focal weakness, loss of balance, numbness, paresthesias and seizures.   Psychiatric/Behavioral: Negative for altered mental status, hallucinations and suicidal ideas.   Allergic/Immunologic: Negative for HIV exposure, hives and persistent infections.           Current Outpatient Prescriptions:   •  albuterol (PROVENTIL) (2.5 MG/3ML) 0.083% nebulizer solution, , Disp: , Rfl:   •  carbidopa-levodopa (SINEMET)  MG per tablet, Take 1 tablet by mouth 3 (Three) Times a Day., Disp: 90 tablet, Rfl: 6  •  clonazePAM (KlonoPIN) 1 MG tablet, Take 1 mg by mouth At Night As Needed., Disp: , Rfl:   •  CONTRAVE 8-90 MG tablet, take 1 tablet by mouth once daily x 7 days, then twice daily x 7 days then 2 in the am and 1 at bedtime X 7 DAYS, then 2 twice daily, Disp: 120 tablet, Rfl: 3  •  DAYSEE 0.15-0.03 &0.01 MG tablet, TAKE 1 TABLET BY MOUTH ONE TIME A DAY , Disp: 91 each, Rfl: 2  •  DULERA 200-5 MCG/ACT inhaler, Use as directed, Disp: , Rfl:   •  glycopyrrolate (ROBINUL) 2 MG tablet, 1 tablet 2 (Two) Times a Day., Disp: , Rfl:   •  levonorgestrel-ethinyl estradiol (AVIANE,ALESSE,LESSINA) 0.1-20 MG-MCG per tablet, 1 daily, Disp: , Rfl:   •  melatonin 5 MG tablet tablet, Take 30 mg by mouth Every Night., Disp: , Rfl:   •  mirtazapine (REMERON) 30 MG tablet, 1 tablet Every Night., Disp: 30 tablet, Rfl: 3  •  Multiple Vitamins-Minerals (MULTIVITAMIN ADULT PO), Take  by mouth., Disp: , Rfl:   •  PREMARIN 0.625 MG/GM vaginal cream, , Disp: , Rfl:   •  PROAIR RESPICLICK 108 90  "BASE) MCG/ACT inhaler, , Disp: , Rfl:   •  promethazine (PHENERGAN) 25 MG tablet, Use PRN, Disp: , Rfl:   •  ranitidine (ZANTAC) 75 MG/5ML syrup, Take 75 mg by mouth 2 (Two) Times a Day., Disp: , Rfl:   •  rOPINIRole (REQUIP) 0.25 MG tablet, , Disp: , Rfl:   •  spironolactone (ALDACTONE) 50 MG tablet, TAKE 1 TABLET BY MOUTH ONCE DAILY FOR ONE MONTH, THEN INCREASE TO 1.5 TABLETS ONCE DAILY FOR 2 WEEKS, THEN 2 TABLETS ONCE DAILY THEREAFTER  (Patient taking differently: TAKE  2 TABLETS ONCE DAILY), Disp: 60 tablet, Rfl: 10  •  trihexyphenidyl (ARTANE) 2 MG tablet, 1 tablet 3 (Three) Times a Day., Disp: , Rfl:   •  VENTOLIN  (90 BASE) MCG/ACT inhaler, PRN, Disp: , Rfl:   •  XOLAIR 150 MG injection, , Disp: , Rfl:      Objective:     Physical Exam   Constitutional: She is oriented to person, place, and time. She appears well-developed and well-nourished.   HENT:   Head: Normocephalic and atraumatic.   Eyes: Conjunctivae are normal. No scleral icterus.   Neck: No JVD present.   Cardiovascular: Normal rate, regular rhythm, normal heart sounds and intact distal pulses.  Exam reveals no gallop and no friction rub.    No murmur heard.  Pulmonary/Chest: Effort normal and breath sounds normal. No respiratory distress.   Abdominal: Soft. Bowel sounds are normal. There is no tenderness.   Musculoskeletal: She exhibits no edema.   Neurological: She is alert and oriented to person, place, and time.   Skin: Skin is warm and dry. No rash noted.   Psychiatric: She has a normal mood and affect. Her behavior is normal.     Blood pressure 118/81, pulse 83, height 160 cm (62.99\"), weight 83.5 kg (184 lb), SpO2 96 %.   Lab Review:       Assessment:         1. Shortness of breath  No evidence of a cardiac etiology to her shortness of breath.  No evidence of ischemic heart disease.    Procedures     Plan:       Evaluate noncardiac causes of shortness of breath.  No changes to her medication therapy is indicated at this time.  No " additional cardiovascular testing is warranted.

## 2018-04-09 NOTE — PROGRESS NOTES
Subjective:     Encounter Date:04/09/2018      Patient ID: Kelly Meza is a 58 y.o. female.    Chief Complaint:  HPI    The following portions of the patient's history were reviewed and updated as appropriate: allergies, current medications, past family history, past medical history, past social history, past surgical history and problem  Review of Systems   Cardiovascular: Positive for dyspnea on exertion. Negative for chest pain, leg swelling and palpitations.   Respiratory: Positive for shortness of breath.    Neurological: Positive for dizziness.           Current Outpatient Prescriptions:   •  albuterol (PROVENTIL) (2.5 MG/3ML) 0.083% nebulizer solution, , Disp: , Rfl:   •  carbidopa-levodopa (SINEMET)  MG per tablet, Take 1 tablet by mouth 3 (Three) Times a Day., Disp: 90 tablet, Rfl: 6  •  clonazePAM (KlonoPIN) 1 MG tablet, Take 1 mg by mouth At Night As Needed., Disp: , Rfl:   •  DAYSEE 0.15-0.03 &0.01 MG tablet, TAKE 1 TABLET BY MOUTH ONE TIME A DAY , Disp: 91 each, Rfl: 2  •  DULERA 200-5 MCG/ACT inhaler, Use as directed, Disp: , Rfl:   •  glycopyrrolate (ROBINUL) 2 MG tablet, 1 tablet 2 (Two) Times a Day., Disp: , Rfl:   •  levonorgestrel-ethinyl estradiol (AVIANE,ALESSE,LESSINA) 0.1-20 MG-MCG per tablet, 1 daily, Disp: , Rfl:   •  melatonin 5 MG tablet tablet, Take 30 mg by mouth Every Night., Disp: , Rfl:   •  mirtazapine (REMERON) 30 MG tablet, 1 tablet Every Night., Disp: 30 tablet, Rfl: 3  •  Multiple Vitamins-Minerals (MULTIVITAMIN ADULT PO), Take  by mouth., Disp: , Rfl:   •  PREMARIN 0.625 MG/GM vaginal cream, , Disp: , Rfl:   •  PROAIR RESPICLICK 108 (90 BASE) MCG/ACT inhaler, , Disp: , Rfl:   •  ranitidine (ZANTAC) 75 MG/5ML syrup, Take 75 mg by mouth 2 (Two) Times a Day., Disp: , Rfl:   •  spironolactone (ALDACTONE) 50 MG tablet, TAKE 1 TABLET BY MOUTH ONCE DAILY FOR ONE MONTH, THEN INCREASE TO 1.5 TABLETS ONCE DAILY FOR 2 WEEKS, THEN 2 TABLETS ONCE DAILY THEREAFTER  (Patient  "taking differently: TAKE  2 TABLETS ONCE DAILY), Disp: 60 tablet, Rfl: 10  •  VENTOLIN  (90 BASE) MCG/ACT inhaler, PRN, Disp: , Rfl:   •  CONTRAVE 8-90 MG tablet, take 1 tablet by mouth once daily x 7 days, then twice daily x 7 days then 2 in the am and 1 at bedtime X 7 DAYS, then 2 twice daily, Disp: 120 tablet, Rfl: 3  •  promethazine (PHENERGAN) 25 MG tablet, Use PRN, Disp: , Rfl:   •  rOPINIRole (REQUIP) 0.25 MG tablet, , Disp: , Rfl:   •  trihexyphenidyl (ARTANE) 2 MG tablet, 1 tablet 3 (Three) Times a Day., Disp: , Rfl:   •  XOLAIR 150 MG injection, , Disp: , Rfl:      Objective:     Physical Exam  Blood pressure 118/81, pulse 83, height 160 cm (62.99\"), weight 83.5 kg (184 lb), SpO2 96 %.   Lab Review:       Assessment:         There are no diagnoses linked to this encounter.  Procedures     Plan:       ***         "

## 2018-05-02 ENCOUNTER — TELEPHONE (OUTPATIENT)
Dept: ENDOCRINOLOGY | Facility: CLINIC | Age: 58
End: 2018-05-02

## 2018-05-02 ENCOUNTER — OFFICE VISIT (OUTPATIENT)
Dept: ENDOCRINOLOGY | Facility: CLINIC | Age: 58
End: 2018-05-02

## 2018-05-02 VITALS
HEART RATE: 80 BPM | WEIGHT: 181.2 LBS | HEIGHT: 63 IN | SYSTOLIC BLOOD PRESSURE: 120 MMHG | BODY MASS INDEX: 32.11 KG/M2 | DIASTOLIC BLOOD PRESSURE: 74 MMHG | OXYGEN SATURATION: 97 %

## 2018-05-02 DIAGNOSIS — E88.81 INSULIN RESISTANCE: ICD-10-CM

## 2018-05-02 DIAGNOSIS — R63.5 WEIGHT GAIN: ICD-10-CM

## 2018-05-02 DIAGNOSIS — E28.2 PCOS (POLYCYSTIC OVARIAN SYNDROME): Primary | ICD-10-CM

## 2018-05-02 PROCEDURE — 99214 OFFICE O/P EST MOD 30 MIN: CPT | Performed by: INTERNAL MEDICINE

## 2018-05-02 RX ORDER — SELEGILINE HYDROCHLORIDE 5 MG/1
5 TABLET ORAL DAILY
COMMUNITY
End: 2019-07-04 | Stop reason: HOSPADM

## 2018-05-02 NOTE — TELEPHONE ENCOUNTER
----- Message from Maria Del Carmen SALAS MD sent at 5/2/2018  2:35 PM EDT -----  Please schedule additional appointment July 10 at 4:15 pm. She needs a check after starting new injectable medication.   Please call and ask if this day would work for her.

## 2018-05-02 NOTE — PROGRESS NOTES
Subjective:   Polycystic Ovary Syndrome (F/u for pcos, pt stated no new sx's)        Kelly Meza is a 58 y.o. female who is being seen for follow-up for hyperglycemia, abnormal thyroid function tests and obesity. In July 2016 patient had evaluation by primary doctor and labs showed normal prolactin of 6.4 and free T4 of 1, TSH of 2.66. She reported family history of thyroid disease.  Patient reported difficulty with weight loss despite diet and exercises. She is currently in HMR program - lost  20 lbs since June, then the weight stabilized and she stopped the program. Now she is going with the weight watchers with no effect.      Started Contrave in the end of May, lost a few lbs. Doing well, no side effects. She stopped Contrave because eof lack of effect    Spironolactone increased to 100 mg daily with very minimal effect and stopped it.   2 weeks ago she was diagnosed with Parkinson's disease, frustrated about diagnoses. She has episodes of mobility problems, shoveling gait at times. Started requip, remeron and sinemet.   She is very frustrated about the weight gain despite following weight watchers program strictly.        Review of Systems  Review of Systems   Constitutional: Positive for appetite change (poor appetite), fatigue and unexpected weight change (weight gain). Negative for activity change, chills and diaphoresis.   HENT: Negative for congestion, ear pain, facial swelling, hearing loss, postnasal drip and trouble swallowing.    Eyes: Negative for visual disturbance.   Respiratory: Negative for cough.    Cardiovascular: Negative for chest pain, palpitations and leg swelling.   Gastrointestinal: Positive for constipation and diarrhea. Negative for abdominal distention, abdominal pain, nausea and vomiting.   Endocrine: Positive for cold intolerance and heat intolerance.        As listed in HPI   Genitourinary: Negative.  Negative for menstrual problem (she had menses up until 50 yo when started  OCP. ).   Musculoskeletal: Positive for back pain and neck pain. Negative for arthralgias, joint swelling, myalgias and neck stiffness.   Skin: Negative.         Facial hair   Allergic/Immunologic: Positive for environmental allergies.   Neurological: Positive for weakness, numbness (tingling in the right arm>left. ) and headaches. Negative for dizziness, tremors, syncope and light-headedness.   Hematological: Negative.    Psychiatric/Behavioral: Positive for sleep disturbance. The patient is nervous/anxious.    All other systems reviewed and are negative.     Current medications:  Current Outpatient Prescriptions   Medication Sig Dispense Refill   • selegiline (ELDEPRYL) 5 MG tablet Take 5 mg by mouth 2 (Two) Times a Day With Meals.     • albuterol (PROVENTIL) (2.5 MG/3ML) 0.083% nebulizer solution      • carbidopa-levodopa (SINEMET)  MG per tablet Take 1 tablet by mouth 3 (Three) Times a Day. 90 tablet 6   • clonazePAM (KlonoPIN) 1 MG tablet Take 1 mg by mouth At Night As Needed.     • DULERA 200-5 MCG/ACT inhaler Use as directed     • glycopyrrolate (ROBINUL) 2 MG tablet 1 tablet 2 (Two) Times a Day.     • Liraglutide -Weight Management (SAXENDA) 18 MG/3ML solution pen-injector Inject 1.8 mg under the skin Daily. Start at 0.6 mg for 1 week, then increase to 1.2 mg for 1-2 weeks, then increase to 1.8 mg 3 pen 6   • melatonin 5 MG tablet tablet Take 30 mg by mouth Every Night.     • mirtazapine (REMERON) 30 MG tablet 1 tablet Every Night. 30 tablet 3   • Multiple Vitamins-Minerals (MULTIVITAMIN ADULT PO) Take  by mouth.     • PREMARIN 0.625 MG/GM vaginal cream      • PROAIR RESPICLICK 108 (90 BASE) MCG/ACT inhaler      • ranitidine (ZANTAC) 75 MG/5ML syrup Take 75 mg by mouth 2 (Two) Times a Day.     • rOPINIRole (REQUIP) 0.25 MG tablet      • trihexyphenidyl (ARTANE) 2 MG tablet 1 tablet 3 (Three) Times a Day.     • VENTOLIN  (90 BASE) MCG/ACT inhaler PRN     • XOLAIR 150 MG injection        No  current facility-administered medications for this visit.        The following portions of the patient's history were reviewed and updated as appropriate: She  has a past medical history of Asthma; Basal cell carcinoma of skin; Bilateral ovarian cysts; Cervical spine disease; GERD (gastroesophageal reflux disease); Heart murmur; Migraine; Ovarian cyst; Parkinson's disease; and Skin cancer.  She  has a past surgical history that includes Breast lumpectomy (Right, 2009); Rotator cuff repair (Right); Finger surgery; Spinal fusion (12/07/2016); and Skin cancer excision.  Her family history includes Arthritis in her mother; Cancer in her father and mother; Heart attack in her maternal grandfather; Heart disease in her maternal grandfather; Hypertension in her maternal grandfather and mother; Kidney disease in her paternal grandmother; Liver disease in her father; Osteoporosis in her maternal grandmother; Thyroid disease in her mother.  She  reports that she quit smoking about 32 years ago. Her smoking use included Cigarettes. She has never used smokeless tobacco. She reports that she does not drink alcohol or use drugs.  She is allergic to ciprofloxacin; latex; codeine sulfate; grass; other; and pollen extract..      Objective:     Vitals:    05/02/18 1153   BP: 120/74   Pulse: 80   SpO2: 97%     Physical Exam   Constitutional: She is oriented to person, place, and time. She appears well-developed and well-nourished.   HENT:   Head: Normocephalic and atraumatic.   Eyes: Conjunctivae are normal.   Neck: No thyromegaly present.   The neck is supple and symmetric   Cardiovascular: Normal rate, regular rhythm and normal heart sounds.    Pulmonary/Chest: Effort normal and breath sounds normal.   Musculoskeletal: She exhibits no edema.   Lymphadenopathy:     She has no cervical adenopathy.   Neurological: She is alert and oriented to person, place, and time.   Skin: Skin is warm and dry. No rash noted.   hirsutism of the chin  and cheeks, neck.    Psychiatric: She has a normal mood and affect. Thought content normal.   Vitals reviewed.      LABS AND IMAGING          Assessment:        Problem List Items Addressed This Visit        Endocrine    PCOS (polycystic ovarian syndrome) - Primary       Other    Weight gain    Insulin resistance      Other Visit Diagnoses    None.              Plan:         Continue with the weight loss program and I have gone over different strategies to maintain low calorie Intake. Different weight loss options reviewed and Saxenda could be a good choice for her. It targets insulin resistance and is a great choice for pCOS  Start Saxenda at 0.6 mg daily and increase to 1.2 in 1 week, 1.8 in 1-2 additional weeks. Side effects reviewed and med administration reviewed with her in details. She has received first dose during her visit today.       Follow-up in 3 months

## 2018-05-06 RX ORDER — LEVONORGESTREL AND ETHINYL ESTRADIOL AND ETHINYL ESTRADIOL 150-30(84)
KIT ORAL
Refills: 1 | OUTPATIENT
Start: 2018-05-06

## 2018-05-07 ENCOUNTER — TELEPHONE (OUTPATIENT)
Dept: INTERNAL MEDICINE | Facility: CLINIC | Age: 58
End: 2018-05-07

## 2018-05-07 RX ORDER — MIRTAZAPINE 30 MG/1
TABLET, FILM COATED ORAL
Qty: 30 TABLET | Refills: 3 | Status: SHIPPED | OUTPATIENT
Start: 2018-05-07 | End: 2018-06-22 | Stop reason: ALTCHOICE

## 2018-05-07 NOTE — TELEPHONE ENCOUNTER
PATIENT CALLED STATING HER PHARMACY ASKED HER TO CALL THIS OFFICE AS THE VICTOZA RX WILL NEED A PA.

## 2018-05-08 NOTE — TELEPHONE ENCOUNTER
Returned pt's call to get updated insurance info so that I can process PA. No answer. Left message requesting for her to return my call.

## 2018-05-16 ENCOUNTER — TELEPHONE (OUTPATIENT)
Dept: INTERNAL MEDICINE | Facility: CLINIC | Age: 58
End: 2018-05-16

## 2018-05-16 NOTE — TELEPHONE ENCOUNTER
PHARMACY IS CALLING NEEDING CLARIFICATION ON PATIENTS SAXENDA PRESCRIPTION. IT LOOKS LIKE THE DIRECTIONS ARE WORDED FOR VICTOZA. THEY NEED TO KNOW IS IT SUPPOSE TO BE SAXENDA OR VICTOZA.  WHICH PRESCRIPTION IS PATIENT SUPPOSE TO BE GETTING.

## 2018-05-17 NOTE — TELEPHONE ENCOUNTER
PA for Saxenda has been completed through [employer] and was sent for review with a turn around time of 24-72 hours

## 2018-05-21 ENCOUNTER — TELEPHONE (OUTPATIENT)
Dept: INTERNAL MEDICINE | Facility: CLINIC | Age: 58
End: 2018-05-21

## 2018-05-21 NOTE — TELEPHONE ENCOUNTER
Returned pt's call and informed her that I had sent in the pen needles this morning to her pharmacy. I also told pt that as long as she is tolerating the current Saxenda dosage..she can increase to the next dosage each week but not to go further than the 1.8 dosage. Pt verbalized understanding

## 2018-05-21 NOTE — TELEPHONE ENCOUNTER
PATIENT NEEDS PEN NEEDLES TO GO WITH SAXENDA RX. SHE IS ALSO WANTING TO KNOW IF SHE CAN NOW INCREASE THE DOSAGE.    CALL BACK [phone] OK TO LEAVE VM, PATIENT IS AT WORK.

## 2018-06-22 ENCOUNTER — OFFICE VISIT (OUTPATIENT)
Dept: NEUROLOGY | Facility: CLINIC | Age: 58
End: 2018-06-22

## 2018-06-22 VITALS
HEIGHT: 63 IN | BODY MASS INDEX: 32.07 KG/M2 | DIASTOLIC BLOOD PRESSURE: 76 MMHG | WEIGHT: 181 LBS | SYSTOLIC BLOOD PRESSURE: 112 MMHG | OXYGEN SATURATION: 96 % | HEART RATE: 90 BPM

## 2018-06-22 DIAGNOSIS — G47.01 INSOMNIA DUE TO MEDICAL CONDITION: Primary | ICD-10-CM

## 2018-06-22 DIAGNOSIS — G20 PARKINSON'S DISEASE (HCC): ICD-10-CM

## 2018-06-22 PROCEDURE — 99213 OFFICE O/P EST LOW 20 MIN: CPT | Performed by: PSYCHIATRY & NEUROLOGY

## 2018-06-22 RX ORDER — QUETIAPINE FUMARATE 50 MG/1
TABLET, FILM COATED ORAL
Qty: 60 TABLET | Refills: 3 | Status: SHIPPED | OUTPATIENT
Start: 2018-06-22 | End: 2018-09-25 | Stop reason: SDUPTHER

## 2018-06-22 NOTE — PROGRESS NOTES
[facility] NEUROLOGY [place] PROGRESS NOTE  History of Present Illness     Date: 6/22/2018    Patient Identification  Kelly Meza is a 58 y.o. female.    Patient information was obtained from patient.  History/Exam limitations: none.    Original consultation requested by: Solomon Li MD      Chief Complaint   Parkinson's Disease (Pt in office today for 6 month follow up. Pt c/o twitching in arm in leg (mainly Right side). Sinemet  was increased to 1.5 tablets QID) and Insomnia (Pt c/o being unable to sleep even with taking medication )      History of Present Illness   Patient is a pleasant 58-year-old referred to [facility] neurology [place] for evaluation of Parkinson disease interval history since office visit patient and been taking Sinemet 25/100-1-1/2 tablet 4 times a day.  Her gait difficulty has improved and her tremor also improved.  The patient is complaining of having difficulty with initiating sleeping maintaining sleep in today visits.    PMH:   Past Medical History:   Diagnosis Date   • Asthma    • Basal cell carcinoma of skin    • Bilateral ovarian cysts    • Cervical spine disease    • GERD (gastroesophageal reflux disease)    • Heart murmur    • Migraine    • Ovarian cyst    • Parkinson's disease    • Skin cancer        Past Surgical History:   Past Surgical History:   Procedure Laterality Date   • BREAST LUMPECTOMY Right 2009   • FINGER SURGERY     • ROTATOR CUFF REPAIR Right    • SKIN CANCER EXCISION     • SPINAL FUSION  12/07/2016    with Bone spur shaved off         Family Hisotry:   Family History   Problem Relation Age of Onset   • Arthritis Mother    • Cancer Mother    • Hypertension Mother    • Thyroid disease Mother    • Cancer Father    • Liver disease Father    • Osteoporosis Maternal Grandmother    • Hypertension Maternal Grandfather    • Heart attack Maternal Grandfather    • Heart disease Maternal Grandfather    • Kidney disease Paternal Grandmother         Social History:   Social History     Social History   • Marital status:      Spouse name: N/A   • Number of children: N/A   • Years of education: N/A     Occupational History   • Not on file.     Social History Main Topics   • Smoking status: Former Smoker     Types: Cigarettes     Quit date: 1986   • Smokeless tobacco: Never Used   • Alcohol use No   • Drug use: No   • Sexual activity: Not on file     Other Topics Concern   • Not on file     Social History Narrative   • No narrative on file       Medications:   Current Outpatient Prescriptions   Medication Sig Dispense Refill   • albuterol (PROVENTIL) (2.5 MG/3ML) 0.083% nebulizer solution      • carbidopa-levodopa (SINEMET)  MG per tablet Take 1 tablet by mouth 3 (Three) Times a Day. (Patient taking differently: Take 1.5 tablets by mouth 4 (Four) Times a Day.) 90 tablet 6   • clonazePAM (KlonoPIN) 1 MG tablet Take 1 mg by mouth At Night As Needed.     • DULERA 200-5 MCG/ACT inhaler Use as directed     • glycopyrrolate (ROBINUL) 2 MG tablet 1 tablet 2 (Two) Times a Day.     • Insulin Pen Needle (BD PEN NEEDLE DEIRDRE U/F) 32G X 4 MM misc 1 each Daily. 30 each 5   • Liraglutide -Weight Management (SAXENDA) 18 MG/3ML solution pen-injector Inject 1.8 mg under the skin Daily. Start at 0.6 mg for 1 week, then increase to 1.2 mg for 1-2 weeks, then increase to 1.8 mg 3 pen 6   • melatonin 5 MG tablet tablet Take 30 mg by mouth Every Night.     • Multiple Vitamins-Minerals (MULTIVITAMIN ADULT PO) Take  by mouth.     • PREMARIN 0.625 MG/GM vaginal cream      • PROAIR RESPICLICK 108 (90 BASE) MCG/ACT inhaler      • ranitidine (ZANTAC) 75 MG/5ML syrup Take 75 mg by mouth 2 (Two) Times a Day As Needed.     • selegiline (ELDEPRYL) 5 MG tablet Take 5 mg by mouth 2 (Two) Times a Day With Meals.     • trihexyphenidyl (ARTANE) 2 MG tablet 1 tablet 3 (Three) Times a Day.     • VENTOLIN  (90 BASE) MCG/ACT inhaler PRN     • XOLAIR 150 MG injection      •  "QUEtiapine (SEROquel) 50 MG tablet Two pills 2 hours before bedtime 60 tablet 3     No current facility-administered medications for this visit.        Allergy:   Allergies   Allergen Reactions   • Ciprofloxacin Anaphylaxis   • Latex Hives and Shortness Of Breath   • Amoxicillin Nausea Only   • Codeine Sulfate Nausea And Vomiting   • Grass    • Other      Animal dander   • Paxil [Paroxetine Hcl] Other (See Comments)     Noticeable mood changes   • Pollen Extract        Review of Systems:  Review of Systems   Review of systems significant for tremor, insomnia  Patient denies any fever chill weight loss  Patient denies any bowel and bladder dysfunction other than constipation  Patient denied any shortness of breath coughing  Patient denies any nausea vomiting diarrhea  Patient denies any depression hallucination    Physical Exam     Vitals:    06/22/18 1113   BP: 112/76   Pulse: 90   SpO2: 96%   Weight: 82.1 kg (181 lb)   Height: 160 cm (63\")     GENERAL: Patient is pleasant, cooperative, appears to be stated age.  Body habitus is endomorphic.  SKIN AND EXTREMITIES:  No skin rashes or lesions are noted.  No cyanosis, clubbing or edema of the extremities.    HEAD:  Head is normocephalic and atraumatic.    NECK: Neck are non-tender without thyromegaly or adenopathy.  Carotic upstrokes are 1+/4.  No cranial or cervical bruits.  The neck is supple with a full range of motion.   ENT: palate elevate symmetrically, no evidence of high arch palate, tongue midline erythema in posterior pharynx, Mallampati Classification Class III   CARDIOVASCULAR:  Regular rate and rhythm with normal S1 and S2 without rub or gallop.  RESPIRATORY:  Clear to auscultation without wheezes or crackle   ABDOMEN:  Soft and non-tender, positive bowel sound without hepatosplenomegaly  BACK:  Back is straight without midline defect.    PSYCH:  Higher cortical function/mental status:  The patient is alert.  She is oriented x3 to time, place and person.  " Recent and the remote memory appear normal.  The patient has a good fund of knowledge.  There is no visual or auditory hallucination or suicidal or homicidal ideation.  SPEECH:There is no gross evidence of aphasia, dysarthria or agnosia.      CRANIAL NERVES:  Pupils are 4mm, equal round reactive to light, reacting briskly to 2mm without afferent pupillary defect.  Visual fields are intact to confrontation testing.  Fundoscopic examination reveals sharp disk margins with normal vasculature.  No papilledema, hemorrhages or exudates.  Extraocular movements are full and smooth with normal pursuits and saccades.  No nystagmus noted.  The face is symmetric. palate elevate symmetrically, Tongue midline, positive gag reflex. The remainder of the cranial nerves are intact and symmetrical.    MOTOR: Strength is 5/5 throughout with normal tone and bulk with the following exceptions, 4/5 intrinsic muscles of the hands and feet.  No involuntary movements noted.    Deep Tendon Reflexes: are 2/4 and symmetrical in the upper extremities, 2/4 and symmetrical at the knees and 1/4 and symmetrical at the Achilles tendon.  Plantar responses were down-going bilaterally.    SENSATION:  Intact to pinprick, light touch, vibration and proprioception.  Coordination:  The patient normally performs finger-nose-finger, heel-to-knee-to-shin and rapid alternating movements in symmetrical fashion.    COORDINATION AND GAIT:  The patient walks with a narrow-based gait.  Patient is able to heel-toe and tandem walk forward and backwards without difficulty.  Romberg and monopedal  Romberg are negative.    MUSCULOSKELETAL: Range of motion normal, no clubbing, cyanosis, or edema.  No joint swelling.              Records Reviewed: I have personally reviewed her previous medical record.    Kelly was seen today for parkinson's disease and insomnia.    Diagnoses and all orders for this visit:    Insomnia due to medical condition    Parkinson's  disease    Other orders  -     QUEtiapine (SEROquel) 50 MG tablet; Two pills 2 hours before bedtime        Treatments:  1.  We'll start this patient Seroquel 50 mg to pill to a mild people bedtime for insomnia  Discussion:  I have counseled patient extensively on the pathophysiology of Parkinson disease and its treatment.  Treatment for Parkinson's Disease (PD), due to its chronic nature, requires broad-based management including patient and family education, support group services, general wellness maintenance, exercise, and nutrition. At present, there is no cure for PD, but medications or surgery can provide relief from the symptoms.  The main families of drugs useful for treating motor symptoms are Levodopa, dopamine agonists  , COMT inhibitors and MAO-B inhibitors.  The most commonly used treatment approach varies depending on the disease stage.  Two phases are usually distinguished: an initial phase in which the individual with PD has already developed some disability for which he needs pharmacological treatment, and a second stage in which the patient develops motor complications related to Dopaminergic medication usage. Treatment in the initial state aims to attain an optimal tradeoff between improvement of PD symptoms and side-effects resulting from medications. The start of L-DOPA treatment may be delayed by using other medications such as MAO-B inhibitors and dopamine agonists, in the hope of causing the onset of dyskinesias to be retarded.  In the second stage the aim is to reduce symptoms while controlling fluctuations of the response to medication. When medications are not enough to control symptoms, surgical techniques such as deep brain stimulation can relieve the associated movement disorders.      Regular physical exercise can be beneficial to maintain and improve mobility, flexibility, strength, gait speed, and quality of life.  Exercise may also improve constipation. Exercise interventions have  been shown to benefit patients with Parkinson's disease in regards to physical functioning, health-related quality of life, balance and fall risk  This Document is signed by Александр Burns MD, FAAN, FAASM   June 22, 201811:31 AM

## 2018-06-23 NOTE — PROGRESS NOTES
Subjective   Kelly Meza is a 58 y.o. female.     History of Present Illness     {Common H&P Review Areas:58581}    Review of Systems    Objective   Physical Exam      Assessment/Plan   {Assess/PlanSmartLinks:61911}

## 2018-07-02 RX ORDER — SPIRONOLACTONE 50 MG/1
TABLET, FILM COATED ORAL
Qty: 60 TABLET | Refills: 10 | OUTPATIENT
Start: 2018-07-02

## 2018-07-05 RX ORDER — LEVONORGESTREL AND ETHINYL ESTRADIOL AND ETHINYL ESTRADIOL 150-30(84)
KIT ORAL
Qty: 30 EACH | Refills: 5 | Status: SHIPPED | OUTPATIENT
Start: 2018-07-05 | End: 2018-08-29 | Stop reason: SDUPTHER

## 2018-07-10 ENCOUNTER — OFFICE VISIT (OUTPATIENT)
Dept: ENDOCRINOLOGY | Facility: CLINIC | Age: 58
End: 2018-07-10

## 2018-07-10 VITALS
HEIGHT: 63 IN | DIASTOLIC BLOOD PRESSURE: 72 MMHG | OXYGEN SATURATION: 99 % | SYSTOLIC BLOOD PRESSURE: 116 MMHG | WEIGHT: 167 LBS | HEART RATE: 78 BPM | BODY MASS INDEX: 29.59 KG/M2

## 2018-07-10 DIAGNOSIS — R63.5 WEIGHT GAIN: ICD-10-CM

## 2018-07-10 DIAGNOSIS — E28.2 PCOS (POLYCYSTIC OVARIAN SYNDROME): Primary | ICD-10-CM

## 2018-07-10 PROCEDURE — 99213 OFFICE O/P EST LOW 20 MIN: CPT | Performed by: INTERNAL MEDICINE

## 2018-07-10 NOTE — PROGRESS NOTES
Subjective:   Polycystic Ovary Syndrome (Follow UP)        Kelly Meza is a 58 y.o. female who is being seen for follow-up for hyperglycemia, abnormal thyroid function tests and obesity. In July 2016 patient had evaluation by primary doctor and labs showed normal prolactin of 6.4 and free T4 of 1, TSH of 2.66. She reported family history of thyroid disease.  Patient reported difficulty with weight loss despite diet and exercises. She has tried different weight loss programs, contrave with minimal effect.   5/2018 we have started Saxenda and she tolerated medication well. She was able to increased the dose slowly to current 1.8. Lost 18-19 lbs. Noted reduced appetite.   Uses Inhibitiff for the hirsutism, minimal effect         Review of Systems  Review of Systems   Constitutional: Positive for appetite change (poor appetite), fatigue and unexpected weight change (weight gain). Negative for activity change, chills and diaphoresis.   HENT: Negative for congestion, ear pain, facial swelling, hearing loss, postnasal drip and trouble swallowing.    Eyes: Negative for visual disturbance.   Respiratory: Negative for cough.    Cardiovascular: Negative for chest pain, palpitations and leg swelling.   Gastrointestinal: Positive for constipation and diarrhea. Negative for abdominal distention, abdominal pain, nausea and vomiting.   Endocrine: Positive for cold intolerance and heat intolerance.        As listed in HPI   Genitourinary: Negative.  Negative for menstrual problem (she had menses up until 50 yo when started OCP. ).   Musculoskeletal: Positive for back pain and neck pain. Negative for arthralgias, joint swelling, myalgias and neck stiffness.   Skin: Negative.         Facial hair   Allergic/Immunologic: Positive for environmental allergies.   Neurological: Positive for weakness, numbness (tingling in the right arm>left. ) and headaches. Negative for dizziness, tremors, syncope and light-headedness.    Hematological: Negative.    Psychiatric/Behavioral: Positive for sleep disturbance. The patient is nervous/anxious.    All other systems reviewed and are negative.     Current medications:  Current Outpatient Prescriptions   Medication Sig Dispense Refill   • albuterol (PROVENTIL) (2.5 MG/3ML) 0.083% nebulizer solution      • ASHLYNA 0.15-0.03 &0.01 MG tablet TAKE 1 TABLET BY MOUTH ONE TIME A DAY  30 each 5   • carbidopa-levodopa (SINEMET)  MG per tablet Take 1 tablet by mouth 3 (Three) Times a Day. (Patient taking differently: Take 1.5 tablets by mouth 4 (Four) Times a Day.) 90 tablet 6   • clonazePAM (KlonoPIN) 1 MG tablet Take 1 mg by mouth At Night As Needed.     • DULERA 200-5 MCG/ACT inhaler Use as directed     • glycopyrrolate (ROBINUL) 2 MG tablet 1 tablet 2 (Two) Times a Day.     • Insulin Pen Needle (BD PEN NEEDLE DEIRDRE U/F) 32G X 4 MM misc 1 each Daily. 30 each 5   • Liraglutide -Weight Management (SAXENDA) 18 MG/3ML solution pen-injector Inject 2.4 mg under the skin Daily. 3 pen 6   • melatonin 5 MG tablet tablet Take 30 mg by mouth Every Night.     • Multiple Vitamins-Minerals (MULTIVITAMIN ADULT PO) Take  by mouth.     • PREMARIN 0.625 MG/GM vaginal cream      • PROAIR RESPICLICK 108 (90 BASE) MCG/ACT inhaler      • QUEtiapine (SEROquel) 50 MG tablet Two pills 2 hours before bedtime 60 tablet 3   • ranitidine (ZANTAC) 75 MG/5ML syrup Take 75 mg by mouth 2 (Two) Times a Day As Needed.     • selegiline (ELDEPRYL) 5 MG tablet Take 5 mg by mouth 2 (Two) Times a Day With Meals.     • trihexyphenidyl (ARTANE) 2 MG tablet 1 tablet 3 (Three) Times a Day.     • VENTOLIN  (90 BASE) MCG/ACT inhaler PRN     • XOLAIR 150 MG injection        No current facility-administered medications for this visit.        The following portions of the patient's history were reviewed and updated as appropriate: She  has a past medical history of Asthma; Basal cell carcinoma of skin; Bilateral ovarian cysts; Cervical  spine disease; GERD (gastroesophageal reflux disease); Heart murmur; Migraine; Ovarian cyst; Parkinson's disease (CMS/HCC); and Skin cancer.  She  has a past surgical history that includes Breast lumpectomy (Right, 2009); Rotator cuff repair (Right); Finger surgery; Spinal fusion (12/07/2016); and Skin cancer excision.  Her family history includes Arthritis in her mother; Cancer in her father and mother; Heart attack in her maternal grandfather; Heart disease in her maternal grandfather; Hypertension in her maternal grandfather and mother; Kidney disease in her paternal grandmother; Liver disease in her father; Osteoporosis in her maternal grandmother; Thyroid disease in her mother.  She  reports that she quit smoking about 32 years ago. Her smoking use included Cigarettes. She has never used smokeless tobacco. She reports that she does not drink alcohol or use drugs.  She is allergic to ciprofloxacin; latex; amoxicillin; codeine sulfate; grass; other; paxil [paroxetine hcl]; and pollen extract..      Objective:     Vitals:    07/10/18 1619   BP: 116/72   Pulse: 78   SpO2: 99%     Physical Exam   Constitutional: She is oriented to person, place, and time. She appears well-developed and well-nourished.   HENT:   Head: Normocephalic and atraumatic.   Eyes: Conjunctivae are normal.   Neck: No thyromegaly present.   The neck is supple and symmetric   Cardiovascular: Normal rate, regular rhythm and normal heart sounds.    Pulmonary/Chest: Effort normal and breath sounds normal.   Musculoskeletal: She exhibits no edema.   Lymphadenopathy:     She has no cervical adenopathy.   Neurological: She is alert and oriented to person, place, and time.   Skin: Skin is warm and dry. No rash noted.   hirsutism of the chin and cheeks, neck.    Psychiatric: She has a normal mood and affect. Thought content normal.   Vitals reviewed.      LABS AND IMAGING          Assessment:        Problem List Items Addressed This Visit         Endocrine    PCOS (polycystic ovarian syndrome) - Primary       Other    Weight gain               Plan:         Continue with the weight loss program and Saxenda. Incresae the dose to 2.4 mg daily.      Follow-up in 3 months with labs

## 2018-07-30 RX ORDER — LEVONORGESTREL AND ETHINYL ESTRADIOL AND ETHINYL ESTRADIOL 150-30(84)
KIT ORAL
Refills: 1 | OUTPATIENT
Start: 2018-07-30

## 2018-07-30 RX ORDER — MIRTAZAPINE 30 MG/1
TABLET, FILM COATED ORAL
Qty: 30 TABLET | Refills: 0 | OUTPATIENT
Start: 2018-07-30

## 2018-07-31 RX ORDER — SPIRONOLACTONE 50 MG/1
TABLET, FILM COATED ORAL
Qty: 60 TABLET | Refills: 10 | OUTPATIENT
Start: 2018-07-31

## 2018-08-29 RX ORDER — SPIRONOLACTONE 50 MG/1
TABLET, FILM COATED ORAL
Qty: 60 TABLET | Refills: 10 | OUTPATIENT
Start: 2018-08-29

## 2018-08-29 RX ORDER — LEVONORGESTREL AND ETHINYL ESTRADIOL AND ETHINYL ESTRADIOL 150-30(84)
KIT ORAL
Qty: 30 EACH | Refills: 1 | Status: SHIPPED | OUTPATIENT
Start: 2018-08-29 | End: 2019-01-28 | Stop reason: SDUPTHER

## 2018-08-30 RX ORDER — MIRTAZAPINE 30 MG/1
TABLET, FILM COATED ORAL
Qty: 30 TABLET | Refills: 0 | OUTPATIENT
Start: 2018-08-30

## 2018-09-12 ENCOUNTER — TRANSCRIBE ORDERS (OUTPATIENT)
Dept: MRI IMAGING | Facility: HOSPITAL | Age: 58
End: 2018-09-12

## 2018-09-12 DIAGNOSIS — M54.31 SCIATICA OF RIGHT SIDE: ICD-10-CM

## 2018-09-12 DIAGNOSIS — M25.551 RIGHT HIP PAIN: Primary | ICD-10-CM

## 2018-09-17 ENCOUNTER — HOSPITAL ENCOUNTER (OUTPATIENT)
Dept: MRI IMAGING | Facility: HOSPITAL | Age: 58
Discharge: HOME | End: 2018-09-17

## 2018-09-25 ENCOUNTER — OFFICE VISIT (OUTPATIENT)
Dept: NEUROLOGY | Facility: CLINIC | Age: 58
End: 2018-09-25

## 2018-09-25 VITALS
HEART RATE: 79 BPM | OXYGEN SATURATION: 97 % | SYSTOLIC BLOOD PRESSURE: 118 MMHG | HEIGHT: 63 IN | DIASTOLIC BLOOD PRESSURE: 74 MMHG

## 2018-09-25 DIAGNOSIS — G47.01 INSOMNIA DUE TO MEDICAL CONDITION: ICD-10-CM

## 2018-09-25 DIAGNOSIS — G20 PARKINSON'S DISEASE (HCC): Primary | ICD-10-CM

## 2018-09-25 PROCEDURE — 99214 OFFICE O/P EST MOD 30 MIN: CPT | Performed by: PSYCHIATRY & NEUROLOGY

## 2018-09-25 RX ORDER — ALBUTEROL SULFATE 90 UG/1
AEROSOL, METERED RESPIRATORY (INHALATION)
COMMUNITY
End: 2018-09-25 | Stop reason: SDUPTHER

## 2018-09-26 RX ORDER — QUETIAPINE FUMARATE 50 MG/1
TABLET, FILM COATED ORAL
Qty: 60 TABLET | Refills: 3 | Status: SHIPPED | OUTPATIENT
Start: 2018-09-26 | End: 2019-01-04 | Stop reason: SDUPTHER

## 2018-09-26 NOTE — PROGRESS NOTES
[facility] NEUROLOGY Levels PROGRESS NOTE  History of Present Illness     Date: 9/26/2018    Patient Identification  Kelly Meza is a 58 y.o. female.    Patient information was obtained from patient.  History/Exam limitations: none.    Original consultation requested by: Solomon Li MD      Chief Complaint   Insomnia (Pt in office today for 3 month follow up ) and Parkinson's Disease      History of Present Illness   Patient is a pleasant 58-year-old referred to [facility] neurology [place] for evaluation of insomnia.  Patient reported that since last visit her symptoms has improved.  Her parkinsonian symptoms also improved on current regimen.  Patient will complain today visit    PMH:   Past Medical History:   Diagnosis Date   • Asthma    • Basal cell carcinoma of skin    • Bilateral ovarian cysts    • Cervical spine disease    • GERD (gastroesophageal reflux disease)    • Heart murmur    • Migraine    • Ovarian cyst    • Parkinson's disease (CMS/HCC)    • Skin cancer        Past Surgical History:   Past Surgical History:   Procedure Laterality Date   • BREAST LUMPECTOMY Right 2009   • FINGER SURGERY     • ROTATOR CUFF REPAIR Right    • SKIN CANCER EXCISION     • SPINAL FUSION  12/07/2016    with Bone spur shaved off         Family Hisotry:   Family History   Problem Relation Age of Onset   • Arthritis Mother    • Cancer Mother    • Hypertension Mother    • Thyroid disease Mother    • Cancer Father    • Liver disease Father    • Osteoporosis Maternal Grandmother    • Hypertension Maternal Grandfather    • Heart attack Maternal Grandfather    • Heart disease Maternal Grandfather    • Kidney disease Paternal Grandmother        Social History:   Social History     Social History   • Marital status:      Spouse name: N/A   • Number of children: N/A   • Years of education: N/A     Occupational History   • Not on file.     Social History Main Topics   • Smoking status: Former Smoker      Types: Cigarettes     Quit date: 1986   • Smokeless tobacco: Never Used   • Alcohol use No   • Drug use: No   • Sexual activity: Not on file     Other Topics Concern   • Not on file     Social History Narrative   • No narrative on file       Medications:   Current Outpatient Prescriptions   Medication Sig Dispense Refill   • albuterol (PROVENTIL) (2.5 MG/3ML) 0.083% nebulizer solution      • ASHLYNA 0.15-0.03 &0.01 MG tablet TAKE 1 TABLET BY MOUTH ONE TIME A DAY  30 each 1   • carbidopa-levodopa (SINEMET)  MG per tablet Take 1 tablet by mouth 3 (Three) Times a Day. (Patient taking differently: Take 1.5 tablets by mouth 4 (Four) Times a Day.) 90 tablet 6   • clonazePAM (KlonoPIN) 1 MG tablet Take 1 mg by mouth At Night As Needed.     • DULERA 200-5 MCG/ACT inhaler Use as directed     • glycopyrrolate (ROBINUL) 2 MG tablet 1 tablet 2 (Two) Times a Day.     • Insulin Pen Needle (BD PEN NEEDLE DEIRDRE U/F) 32G X 4 MM misc 1 each Daily. 30 each 5   • Liraglutide -Weight Management (SAXENDA) 18 MG/3ML solution pen-injector Inject 2.4 mg under the skin Daily. 3 pen 6   • melatonin 5 MG tablet tablet Take 30 mg by mouth Every Night.     • Multiple Vitamins-Minerals (MULTIVITAMIN ADULT PO) Take  by mouth.     • PREMARIN 0.625 MG/GM vaginal cream      • QUEtiapine (SEROquel) 50 MG tablet Two pills 2 hours before bedtime 60 tablet 3   • ranitidine (ZANTAC) 75 MG/5ML syrup Take 75 mg by mouth 2 (Two) Times a Day As Needed.     • trihexyphenidyl (ARTANE) 2 MG tablet 1 tablet 3 (Three) Times a Day.     • VENTOLIN  (90 BASE) MCG/ACT inhaler PRN     • XOLAIR 150 MG injection      • selegiline (ELDEPRYL) 5 MG tablet Take 5 mg by mouth 2 (Two) Times a Day With Meals.       No current facility-administered medications for this visit.        Allergy:   Allergies   Allergen Reactions   • Ciprofloxacin Anaphylaxis   • Latex Hives and Shortness Of Breath   • Amoxicillin Nausea Only   • Codeine Sulfate Nausea And Vomiting   •  "Grass    • Other      Animal dander   • Paxil [Paroxetine Hcl] Other (See Comments)     Noticeable mood changes   • Pollen Extract        Review of Systems:  Review of Systems   Constitutional: Negative for chills and fever.   HENT: Negative for congestion, ear pain, hearing loss, rhinorrhea and sore throat.    Eyes: Negative for pain, discharge and redness.   Respiratory: Negative for cough, shortness of breath, wheezing and stridor.    Cardiovascular: Negative for chest pain, palpitations and leg swelling.   Gastrointestinal: Negative for abdominal pain, constipation, nausea and vomiting.   Endocrine: Negative for cold intolerance, heat intolerance and polyphagia.   Genitourinary: Negative for dysuria, flank pain, frequency and urgency.   Musculoskeletal: Positive for gait problem. Negative for joint swelling, myalgias, neck pain and neck stiffness.   Skin: Negative for pallor, rash and wound.   Allergic/Immunologic: Negative for environmental allergies.   Neurological: Positive for tremors. Negative for dizziness, seizures, syncope, facial asymmetry, speech difficulty, weakness, light-headedness, numbness and headaches.   Hematological: Negative for adenopathy.   Psychiatric/Behavioral: Positive for sleep disturbance. Negative for confusion and hallucinations. The patient is not nervous/anxious.        Physical Exam     Vitals:    09/25/18 1638   BP: 118/74   Pulse: 79   SpO2: 97%   Height: 160 cm (63\")     GENERAL: Patient is pleasant, cooperative, appears to be stated age.  Body habitus is endomorphic.  SKIN AND EXTREMITIES:  No skin rashes or lesions are noted.  No cyanosis, clubbing or edema of the extremities.    HEAD:  Head is normocephalic and atraumatic.    NECK: Neck are non-tender without thyromegaly or adenopathy.  Carotic upstrokes are 1+/4.  No cranial or cervical bruits.  The neck is supple with a full range of motion.   ENT: palate elevate symmetrically, no evidence of high arch palate, tongue " midline erythema in posterior pharynx, Mallampati Classification Class III   CARDIOVASCULAR:  Regular rate and rhythm with normal S1 and S2 without rub or gallop.  RESPIRATORY:  Clear to auscultation without wheezes or crackle   ABDOMEN:  Soft and non-tender, positive bowel sound without hepatosplenomegaly  BACK:  Back is straight without midline defect.    PSYCH:  Higher cortical function/mental status:  The patient is alert.  She is oriented x3 to time, place and person.  Recent and the remote memory appear normal.  The patient has a good fund of knowledge.  There is no visual or auditory hallucination or suicidal or homicidal ideation.  SPEECH:There is no gross evidence of aphasia, dysarthria or agnosia.      CRANIAL NERVES:  Pupils are 4mm, equal round reactive to light, reacting briskly to 2mm without afferent pupillary defect.  Visual fields are intact to confrontation testing.  Fundoscopic examination reveals sharp disk margins with normal vasculature.  No papilledema, hemorrhages or exudates.  Extraocular movements are full and smooth with normal pursuits and saccades.  No nystagmus noted.  The face is symmetric. palate elevate symmetrically, Tongue midline, positive gag reflex. The remainder of the cranial nerves are intact and symmetrical.    MOTOR: Strength is 5/5 throughout with normal tone and bulk with the following exceptions, 4/5 intrinsic muscles of the hands and feet.  No involuntary movements noted.    Deep Tendon Reflexes: are 2/4 and symmetrical in the upper extremities, 2/4 and symmetrical at the knees and 1/4 and symmetrical at the Achilles tendon.  Plantar responses were down-going bilaterally.    SENSATION:  Intact to pinprick, light touch, vibration and proprioception.  Coordination:  The patient normally performs finger-nose-finger, heel-to-knee-to-shin and rapid alternating movements in symmetrical fashion.    COORDINATION AND GAIT:  The patient walks with a narrow-based gait.  Patient is  able to heel-toe and tandem walk forward and backwards without difficulty.  Romberg and monopedal  Romberg are negative.    MUSCULOSKELETAL: Range of motion normal, no clubbing, cyanosis, or edema.  No joint swelling.              Records Reviewed: I have personally reviewed her previous medical record.    Kelly was seen today for insomnia and parkinson's disease.    Diagnoses and all orders for this visit:    Parkinson's disease (CMS/HCC)    Insomnia due to medical condition    Other orders  -     QUEtiapine (SEROquel) 50 MG tablet; Two pills 2 hours before bedtime        Treatments:  1.  Entering the patient on Seroquel 50 mg to pill to house people bedtime for insomnia  2.  Continue patient on Sinemet   Discussion:  I have counseled patient extensively on the pathophysiology of Parkinson disease and its treatment.  Treatment for Parkinson's Disease (PD), due to its chronic nature, requires broad-based management including patient and family education, support group services, general wellness maintenance, exercise, and nutrition. At present, there is no cure for PD, but medications or surgery can provide relief from the symptoms.  The main families of drugs useful for treating motor symptoms are Levodopa, dopamine agonists  , COMT inhibitors and MAO-B inhibitors.  The most commonly used treatment approach varies depending on the disease stage.  Two phases are usually distinguished: an initial phase in which the individual with PD has already developed some disability for which he needs pharmacological treatment, and a second stage in which the patient develops motor complications related to Dopaminergic medication usage. Treatment in the initial state aims to attain an optimal tradeoff between improvement of PD symptoms and side-effects resulting from medications. The start of L-DOPA treatment may be delayed by using other medications such as MAO-B inhibitors and dopamine agonists, in the hope of causing the onset  of dyskinesias to be retarded.  In the second stage the aim is to reduce symptoms while controlling fluctuations of the response to medication. When medications are not enough to control symptoms, surgical techniques such as deep brain stimulation can relieve the associated movement disorders.      Regular physical exercise can be beneficial to maintain and improve mobility, flexibility, strength, gait speed, and quality of life.  Exercise may also improve constipation. Exercise interventions have been shown to benefit patients with Parkinson's disease in regards to physical functioning, health-related quality of life, balance and fall risk  This Document is signed by Александр Burns MD, FAAN, FAASM   September 26, 201812:40 AM

## 2018-09-28 RX ORDER — MIRTAZAPINE 30 MG/1
TABLET, FILM COATED ORAL
Qty: 30 TABLET | Refills: 0 | OUTPATIENT
Start: 2018-09-28

## 2018-09-30 RX ORDER — SPIRONOLACTONE 50 MG/1
TABLET, FILM COATED ORAL
Qty: 60 TABLET | Refills: 10 | OUTPATIENT
Start: 2018-09-30

## 2018-10-01 NOTE — TELEPHONE ENCOUNTER
Please check with the patient and if she is taking 100  Mg daily, change rx and send 100 mg daily

## 2018-10-28 RX ORDER — LEVONORGESTREL AND ETHINYL ESTRADIOL AND ETHINYL ESTRADIOL 150-30(84)
KIT ORAL
Refills: 1 | OUTPATIENT
Start: 2018-10-28

## 2018-10-28 RX ORDER — SPIRONOLACTONE 50 MG/1
TABLET, FILM COATED ORAL
Qty: 60 TABLET | Refills: 10 | OUTPATIENT
Start: 2018-10-28

## 2018-10-29 RX ORDER — MIRTAZAPINE 30 MG/1
TABLET, FILM COATED ORAL
Qty: 30 TABLET | Refills: 0 | OUTPATIENT
Start: 2018-10-29

## 2018-10-31 ENCOUNTER — OFFICE VISIT (OUTPATIENT)
Dept: ENDOCRINOLOGY | Facility: CLINIC | Age: 58
End: 2018-10-31

## 2018-10-31 VITALS
HEART RATE: 57 BPM | SYSTOLIC BLOOD PRESSURE: 118 MMHG | WEIGHT: 151.6 LBS | DIASTOLIC BLOOD PRESSURE: 72 MMHG | BODY MASS INDEX: 26.86 KG/M2 | OXYGEN SATURATION: 98 % | HEIGHT: 63 IN

## 2018-10-31 DIAGNOSIS — R63.5 WEIGHT GAIN: ICD-10-CM

## 2018-10-31 DIAGNOSIS — L68.0 HIRSUTISM: ICD-10-CM

## 2018-10-31 DIAGNOSIS — E88.81 INSULIN RESISTANCE: ICD-10-CM

## 2018-10-31 DIAGNOSIS — E28.2 PCOS (POLYCYSTIC OVARIAN SYNDROME): Primary | ICD-10-CM

## 2018-10-31 LAB
ALBUMIN SERPL-MCNC: 4.45 G/DL (ref 3.2–4.8)
ALBUMIN/GLOB SERPL: 2.3 G/DL (ref 1.5–2.5)
ALP SERPL-CCNC: 92 U/L (ref 25–100)
ALT SERPL W P-5'-P-CCNC: 8 U/L (ref 7–40)
ANION GAP SERPL CALCULATED.3IONS-SCNC: 8 MMOL/L (ref 3–11)
AST SERPL-CCNC: 22 U/L (ref 0–33)
BILIRUB SERPL-MCNC: 0.3 MG/DL (ref 0.3–1.2)
BUN BLD-MCNC: 11 MG/DL (ref 9–23)
BUN/CREAT SERPL: 12.2 (ref 7–25)
CALCIUM SPEC-SCNC: 9.4 MG/DL (ref 8.7–10.4)
CHLORIDE SERPL-SCNC: 106 MMOL/L (ref 99–109)
CO2 SERPL-SCNC: 27 MMOL/L (ref 20–31)
CREAT BLD-MCNC: 0.9 MG/DL (ref 0.6–1.3)
GFR SERPL CREATININE-BSD FRML MDRD: 65 ML/MIN/1.73
GLOBULIN UR ELPH-MCNC: 2 GM/DL
GLUCOSE BLD-MCNC: 68 MG/DL (ref 70–100)
POTASSIUM BLD-SCNC: 3.7 MMOL/L (ref 3.5–5.5)
PROT SERPL-MCNC: 6.4 G/DL (ref 5.7–8.2)
SODIUM BLD-SCNC: 141 MMOL/L (ref 132–146)
TSH SERPL DL<=0.05 MIU/L-ACNC: 2.09 MIU/ML (ref 0.35–5.35)

## 2018-10-31 PROCEDURE — 99213 OFFICE O/P EST LOW 20 MIN: CPT | Performed by: INTERNAL MEDICINE

## 2018-10-31 PROCEDURE — 80053 COMPREHEN METABOLIC PANEL: CPT | Performed by: INTERNAL MEDICINE

## 2018-10-31 PROCEDURE — 84443 ASSAY THYROID STIM HORMONE: CPT | Performed by: INTERNAL MEDICINE

## 2018-10-31 NOTE — PROGRESS NOTES
Subjective:   Polycystic Ovary Syndrome (Follow UP)        Kelly Meza is a 58 y.o. female who is being seen for follow-up for hyperglycemia, abnormal thyroid function tests and obesity. In July 2016 patient had evaluation by primary doctor and labs showed normal prolactin of 6.4 and free T4 of 1, TSH of 2.66. She reported family history of thyroid disease.  Patient reported difficulty with weight loss despite diet and exercises. She has tried different weight loss programs, contrave with minimal effect.   5/2018 we have started Saxenda and she tolerated medication well. She was able to increased the dose slowly to current 2.4. Lost 30 lbs. Noted reduced appetite.     Tolerated well, but noticed platoe in the weight in the last 3 weeks.          Review of Systems  Review of Systems   Constitutional: Positive for appetite change (poor appetite), fatigue and unexpected weight change (weight gain). Negative for activity change, chills and diaphoresis.   HENT: Negative for congestion, ear pain, facial swelling, hearing loss, postnasal drip and trouble swallowing.    Eyes: Negative for visual disturbance.   Respiratory: Negative for cough.    Cardiovascular: Negative for chest pain, palpitations and leg swelling.   Gastrointestinal: Positive for constipation and diarrhea. Negative for abdominal distention, abdominal pain, nausea and vomiting.   Endocrine: Positive for cold intolerance and heat intolerance.        As listed in HPI   Genitourinary: Negative.  Negative for menstrual problem (she had menses up until 52 yo when started OCP. ).   Musculoskeletal: Positive for back pain and neck pain. Negative for arthralgias, joint swelling, myalgias and neck stiffness.   Skin: Negative.         Facial hair   Allergic/Immunologic: Positive for environmental allergies.   Neurological: Positive for weakness, numbness (tingling in the right arm>left. ) and headaches. Negative for dizziness, tremors, syncope and  light-headedness.   Hematological: Negative.    Psychiatric/Behavioral: Positive for sleep disturbance. The patient is nervous/anxious.    All other systems reviewed and are negative.     Current medications:  Current Outpatient Prescriptions   Medication Sig Dispense Refill   • albuterol (PROVENTIL) (2.5 MG/3ML) 0.083% nebulizer solution      • ASHLYNA 0.15-0.03 &0.01 MG tablet TAKE 1 TABLET BY MOUTH ONE TIME A DAY  30 each 1   • carbidopa-levodopa (SINEMET)  MG per tablet Take 1 tablet by mouth 3 (Three) Times a Day. (Patient taking differently: Take 1.5 tablets by mouth 4 (Four) Times a Day.) 90 tablet 6   • clonazePAM (KlonoPIN) 1 MG tablet Take 1 mg by mouth At Night As Needed.     • DULERA 200-5 MCG/ACT inhaler Use as directed     • glycopyrrolate (ROBINUL) 2 MG tablet 1 tablet 2 (Two) Times a Day.     • Insulin Pen Needle (BD PEN NEEDLE DEIRDRE U/F) 32G X 4 MM misc 1 each Daily. 30 each 5   • Liraglutide -Weight Management (SAXENDA) 18 MG/3ML solution pen-injector Inject 3 mg under the skin into the appropriate area as directed Daily. 4 pen 6   • melatonin 5 MG tablet tablet Take 30 mg by mouth Every Night.     • Multiple Vitamins-Minerals (MULTIVITAMIN ADULT PO) Take  by mouth.     • PREMARIN 0.625 MG/GM vaginal cream      • QUEtiapine (SEROquel) 50 MG tablet Two pills 2 hours before bedtime 60 tablet 3   • ranitidine (ZANTAC) 75 MG/5ML syrup Take 75 mg by mouth 2 (Two) Times a Day As Needed.     • selegiline (ELDEPRYL) 5 MG tablet Take 5 mg by mouth 2 (Two) Times a Day With Meals.     • trihexyphenidyl (ARTANE) 2 MG tablet 1 tablet 3 (Three) Times a Day.     • VENTOLIN  (90 BASE) MCG/ACT inhaler PRN     • XOLAIR 150 MG injection        No current facility-administered medications for this visit.        The following portions of the patient's history were reviewed and updated as appropriate: She  has a past medical history of Asthma; Basal cell carcinoma of skin; Bilateral ovarian cysts; Cervical  spine disease; GERD (gastroesophageal reflux disease); Heart murmur; Migraine; Ovarian cyst; Parkinson's disease (CMS/HCC); and Skin cancer.  She  has a past surgical history that includes Breast lumpectomy (Right, 2009); Rotator cuff repair (Right); Finger surgery; Spinal fusion (12/07/2016); and Skin cancer excision.  Her family history includes Arthritis in her mother; Cancer in her father and mother; Heart attack in her maternal grandfather; Heart disease in her maternal grandfather; Hypertension in her maternal grandfather and mother; Kidney disease in her paternal grandmother; Liver disease in her father; Osteoporosis in her maternal grandmother; Thyroid disease in her mother.  She  reports that she quit smoking about 32 years ago. Her smoking use included Cigarettes. She has never used smokeless tobacco. She reports that she does not drink alcohol or use drugs.  She is allergic to ciprofloxacin; latex; amoxicillin; codeine sulfate; grass; other; paxil [paroxetine hcl]; and pollen extract..      Objective:     Vitals:    10/31/18 1458   BP: 118/72   Pulse: 57   SpO2: 98%   Body mass index is 26.85 kg/m².  Physical Exam   Constitutional: She is oriented to person, place, and time. She appears well-developed and well-nourished.   HENT:   Head: Normocephalic and atraumatic.   Eyes: Conjunctivae are normal.   Neck: No thyromegaly present.   The neck is supple and symmetric   Cardiovascular: Normal rate, regular rhythm and normal heart sounds.    Pulmonary/Chest: Effort normal and breath sounds normal.   Musculoskeletal: She exhibits no edema.   Lymphadenopathy:     She has no cervical adenopathy.   Neurological: She is alert and oriented to person, place, and time.   Skin: Skin is warm and dry. No rash noted.   hirsutism of the chin and cheeks, neck.    Psychiatric: She has a normal mood and affect. Thought content normal.   Vitals reviewed.      LABS AND IMAGING          Assessment:        Problem List Items  Addressed This Visit        Endocrine    PCOS (polycystic ovarian syndrome) - Primary       Musculoskeletal and Integument    Hirsutism       Other    Weight gain    Insulin resistance               Plan:         Continue with the weight loss program and Saxenda 3 mg daily. She is currently almost at the goal weight.     Labs today       Follow-up in 6 months

## 2018-11-27 RX ORDER — SPIRONOLACTONE 50 MG/1
100 TABLET, FILM COATED ORAL DAILY
Qty: 60 TABLET | Refills: 10 | Status: SHIPPED | OUTPATIENT
Start: 2018-11-27 | End: 2019-01-28 | Stop reason: SDUPTHER

## 2018-11-27 RX ORDER — MIRTAZAPINE 30 MG/1
TABLET, FILM COATED ORAL
Qty: 30 TABLET | Refills: 0 | OUTPATIENT
Start: 2018-11-27

## 2018-11-27 RX ORDER — LEVONORGESTREL AND ETHINYL ESTRADIOL AND ETHINYL ESTRADIOL 150-30(84)
KIT ORAL
Qty: 91 EACH | Refills: 1 | Status: SHIPPED | OUTPATIENT
Start: 2018-11-27 | End: 2019-01-04 | Stop reason: SDUPTHER

## 2019-01-04 ENCOUNTER — OFFICE VISIT (OUTPATIENT)
Dept: NEUROLOGY | Facility: CLINIC | Age: 59
End: 2019-01-04

## 2019-01-04 VITALS
OXYGEN SATURATION: 99 % | HEIGHT: 63 IN | HEART RATE: 68 BPM | SYSTOLIC BLOOD PRESSURE: 122 MMHG | DIASTOLIC BLOOD PRESSURE: 72 MMHG | BODY MASS INDEX: 26.85 KG/M2

## 2019-01-04 DIAGNOSIS — G47.01 INSOMNIA DUE TO MEDICAL CONDITION: ICD-10-CM

## 2019-01-04 DIAGNOSIS — G20 PARKINSON'S DISEASE (HCC): Primary | ICD-10-CM

## 2019-01-04 PROCEDURE — 99214 OFFICE O/P EST MOD 30 MIN: CPT | Performed by: PSYCHIATRY & NEUROLOGY

## 2019-01-04 RX ORDER — QUETIAPINE FUMARATE 50 MG/1
TABLET, FILM COATED ORAL
Qty: 90 TABLET | Refills: 3 | Status: SHIPPED | OUTPATIENT
Start: 2019-01-04 | End: 2019-02-26 | Stop reason: SDUPTHER

## 2019-01-04 RX ORDER — QUETIAPINE FUMARATE 50 MG/1
TABLET, FILM COATED ORAL
Qty: 60 TABLET | Refills: 3 | Status: SHIPPED | OUTPATIENT
Start: 2019-01-04 | End: 2019-01-04 | Stop reason: SDUPTHER

## 2019-01-04 NOTE — PROGRESS NOTES
[facility] NEUROLOGY [place] PROGRESS NOTE  History of Present Illness     Date: 1/4/2019    Patient Identification  Kelly Meza is a 58 y.o. female.    Patient information was obtained from patient.  History/Exam limitations: none.    Original consultation requested by: Solomon Li M.D.      Chief Complaint   Parkinson's Disease (Pt in office today for follow up appt) and Med Management (Pt states she sometimes takes more Seroquel than rx'ed )      History of Present Illness   Patient is a pleasant 58-year-old referred to [facility] neurology [place] for Parkinson disease and insomnia.  Patient reported that she is using Seroquel 50 mg 3 pill at bedtime which seems to help her sleep well at nighttime.  Patient is very active with [employer] [employer], boxing therapy.  She reports to be ambulating better and her tremor has improved.  Is very pleased to see the improvement on current regiment of Sinemet 25/100 one pill 3 times a day    PMH:   Past Medical History:   Diagnosis Date   • Asthma    • Basal cell carcinoma of skin    • Bilateral ovarian cysts    • Cervical spine disease    • GERD (gastroesophageal reflux disease)    • Heart murmur    • Migraine    • Ovarian cyst    • Parkinson's disease (CMS/HCC)    • Skin cancer        Past Surgical History:   Past Surgical History:   Procedure Laterality Date   • BREAST LUMPECTOMY Right 2009   • FINGER SURGERY     • ROTATOR CUFF REPAIR Right    • SKIN CANCER EXCISION     • SPINAL FUSION  12/07/2016    with Bone spur shaved off         Family Hisotry:   Family History   Problem Relation Age of Onset   • Arthritis Mother    • Cancer Mother    • Hypertension Mother    • Thyroid disease Mother    • Cancer Father    • Liver disease Father    • Osteoporosis Maternal Grandmother    • Hypertension Maternal Grandfather    • Heart attack Maternal Grandfather    • Heart disease Maternal Grandfather    • Kidney disease Paternal Grandmother        Social History:   Social  History     Socioeconomic History   • Marital status:      Spouse name: Not on file   • Number of children: Not on file   • Years of education: Not on file   • Highest education level: Not on file   Social Needs   • Financial resource strain: Not on file   • Food insecurity - worry: Not on file   • Food insecurity - inability: Not on file   • Transportation needs - medical: Not on file   • Transportation needs - non-medical: Not on file   Occupational History   • Not on file   Tobacco Use   • Smoking status: Former Smoker     Types: Cigarettes     Last attempt to quit: 1986     Years since quittin.0   • Smokeless tobacco: Never Used   Substance and Sexual Activity   • Alcohol use: No   • Drug use: No   • Sexual activity: Not on file   Other Topics Concern   • Not on file   Social History Narrative   • Not on file       Medications:   Current Outpatient Medications   Medication Sig Dispense Refill   • albuterol (PROVENTIL) (2.5 MG/3ML) 0.083% nebulizer solution      • ASHLYNA 0.15-0.03 &0.01 MG tablet TAKE 1 TABLET BY MOUTH ONE TIME A DAY  30 each 1   • carbidopa-levodopa (SINEMET)  MG per tablet Take 1 tablet by mouth 3 (Three) Times a Day. (Patient taking differently: Take 1.5 tablets by mouth 4 (Four) Times a Day.) 90 tablet 6   • clonazePAM (KlonoPIN) 1 MG tablet Take 1 mg by mouth At Night As Needed.     • DULERA 200-5 MCG/ACT inhaler Use as directed     • glycopyrrolate (ROBINUL) 2 MG tablet 1 tablet 2 (Two) Times a Day.     • Insulin Pen Needle (BD PEN NEEDLE DEIRDRE U/F) 32G X 4 MM misc 1 each Daily. 30 each 5   • Liraglutide -Weight Management (SAXENDA) 18 MG/3ML solution pen-injector Inject 3 mg under the skin into the appropriate area as directed Daily. 4 pen 6   • melatonin 5 MG tablet tablet Take 30 mg by mouth Every Night.     • Multiple Vitamins-Minerals (MULTIVITAMIN ADULT PO) Take  by mouth.     • PREMARIN 0.625 MG/GM vaginal cream      • QUEtiapine (SEROquel) 50 MG tablet Two pills 2  "hours before bedtime 90 tablet 3   • ranitidine (ZANTAC) 75 MG/5ML syrup Take 75 mg by mouth 2 (Two) Times a Day As Needed.     • selegiline (ELDEPRYL) 5 MG tablet Take 5 mg by mouth 2 (Two) Times a Day With Meals.     • spironolactone (ALDACTONE) 50 MG tablet Take 2 tablets by mouth Daily. 60 tablet 10   • trihexyphenidyl (ARTANE) 2 MG tablet 1 tablet 3 (Three) Times a Day.     • VENTOLIN  (90 BASE) MCG/ACT inhaler PRN     • XOLAIR 150 MG injection        No current facility-administered medications for this visit.        Allergy:   Allergies   Allergen Reactions   • Ciprofloxacin Anaphylaxis   • Latex Hives and Shortness Of Breath   • Amoxicillin Nausea Only   • Codeine Sulfate Nausea And Vomiting   • Grass    • Other      Animal dander   • Paxil [Paroxetine Hcl] Other (See Comments)     Noticeable mood changes   • Pollen Extract        Review of Systems:  Review of Systems   Constitutional: Negative.  Negative for chills and fever.   Respiratory: Negative.  Negative for choking and shortness of breath.    Cardiovascular: Negative.  Negative for chest pain and palpitations.   Gastrointestinal: Negative.  Negative for nausea and vomiting.   Musculoskeletal: Positive for gait problem.   Neurological: Positive for tremors.   Psychiatric/Behavioral: Positive for sleep disturbance.       Physical Exam     Vitals:    01/04/19 1615   BP: 122/72   Pulse: 68   SpO2: 99%   Height: 160 cm (63\")     GENERAL: Patient is pleasant, cooperative, appears to be stated age.  Body habitus is endomorphic.  SKIN AND EXTREMITIES:  No skin rashes or lesions are noted.  No cyanosis, clubbing or edema of the extremities.    HEAD:  Head is normocephalic and atraumatic.    NECK: Neck are non-tender without thyromegaly or adenopathy.  Carotic upstrokes are 1+/4.  No cranial or cervical bruits.  The neck is supple with a full range of motion.   ENT: palate elevate symmetrically, no evidence of high arch palate, tongue midline erythema " in posterior pharynx, Mallampati Classification Class III   CARDIOVASCULAR:  Regular rate and rhythm with normal S1 and S2 without rub or gallop.  RESPIRATORY:  Clear to auscultation without wheezes or crackle   ABDOMEN:  Soft and non-tender, positive bowel sound without hepatosplenomegaly  BACK:  Back is straight without midline defect.    PSYCH:  Higher cortical function/mental status:  The patient is alert.  She is oriented x3 to time, place and person.  Recent and the remote memory appear normal.  The patient has a good fund of knowledge.  There is no visual or auditory hallucination or suicidal or homicidal ideation.  SPEECH:There is no gross evidence of aphasia, dysarthria or agnosia.      CRANIAL NERVES:  Pupils are 4mm, equal round reactive to light, reacting briskly to 2mm without afferent pupillary defect.  Visual fields are intact to confrontation testing.  Fundoscopic examination reveals sharp disk margins with normal vasculature.  No papilledema, hemorrhages or exudates.  Extraocular movements are full and smooth with normal pursuits and saccades.  No nystagmus noted.  The face is symmetric. palate elevate symmetrically, Tongue midline, positive gag reflex. The remainder of the cranial nerves are intact and symmetrical.    MOTOR: Strength is 5/5 throughout with normal tone and bulk with the following exceptions, 4/5 intrinsic muscles of the hands and feet.  No involuntary movements noted.    Deep Tendon Reflexes: are 2/4 and symmetrical in the upper extremities, 2/4 and symmetrical at the knees and 1/4 and symmetrical at the Achilles tendon.  Plantar responses were down-going bilaterally.    SENSATION:  Intact to pinprick, light touch, vibration and proprioception.     COORDINATION AND GAIT:  The patient walks with a narrow-based gait. MUSCULOSKELETAL: Range of motion normal, no clubbing, cyanosis, or edema.  No joint swelling.            Records Reviewed: I have personally reviewed her previous medical  record.    Kelly was seen today for parkinson's disease and med management.    Diagnoses and all orders for this visit:    Parkinson's disease (CMS/HCC)    Insomnia due to medical condition    Other orders  -     Discontinue: QUEtiapine (SEROquel) 50 MG tablet; Two pills 2 hours before bedtime  -     QUEtiapine (SEROquel) 50 MG tablet; Two pills 2 hours before bedtime        Treatments:    Discussion:  In summary, 58-year-old referred to [facility] neurology [place] for Parkinson disease and insomnia.  Patient reported that she is using Seroquel 50 mg 3 pill at bedtime which seems to help her sleep well at nighttime.  Patient is very active with [facility], boxing therapy.  She reports to be ambulating better and her tremor has improved.  Is very pleased to see the improvement on current regiment of Sinemet 25/100 one pill 3 times a day    I have counseled patient extensively on the pathophysiology of Parkinson disease and its treatment.  Treatment for Parkinson's Disease (PD), due to its chronic nature, requires broad-based management including patient and family education, support group services, general wellness maintenance, exercise, and nutrition. At present, there is no cure for PD, but medications or surgery can provide relief from the symptoms.  The main families of drugs useful for treating motor symptoms are Levodopa, dopamine agonists  , COMT inhibitors and MAO-B inhibitors.  The most commonly used treatment approach varies depending on the disease stage.  Two phases are usually distinguished: an initial phase in which the individual with PD has already developed some disability for which he needs pharmacological treatment, and a second stage in which the patient develops motor complications related to Dopaminergic medication usage. Treatment in the initial state aims to attain an optimal tradeoff between improvement of PD symptoms and side-effects resulting from medications. The start of L-DOPA  treatment may be delayed by using other medications such as MAO-B inhibitors and dopamine agonists, in the hope of causing the onset of dyskinesias to be retarded.  In the second stage the aim is to reduce symptoms while controlling fluctuations of the response to medication. When medications are not enough to control symptoms, surgical techniques such as deep brain stimulation can relieve the associated movement disorders.      Regular physical exercise can be beneficial to maintain and improve mobility, flexibility, strength, gait speed, and quality of life.  Exercise may also improve constipation. Exercise interventions have been shown to benefit patients with Parkinson's disease in regards to physical functioning, health-related quality of life, balance and fall risk  This Document is signed by Александр Burns MD, FAAN, FAASM   January 4, 20199:36 PM

## 2019-01-28 RX ORDER — MIRTAZAPINE 30 MG/1
TABLET, FILM COATED ORAL
Qty: 30 TABLET | Refills: 0 | OUTPATIENT
Start: 2019-01-28

## 2019-01-28 RX ORDER — LEVONORGESTREL AND ETHINYL ESTRADIOL AND ETHINYL ESTRADIOL 150-30(84)
KIT ORAL
Qty: 30 EACH | Refills: 1 | OUTPATIENT
Start: 2019-01-28 | End: 2019-07-04 | Stop reason: HOSPADM

## 2019-01-28 RX ORDER — SPIRONOLACTONE 50 MG/1
TABLET, FILM COATED ORAL
Qty: 60 TABLET | Refills: 10 | Status: SHIPPED | OUTPATIENT
Start: 2019-01-28 | End: 2019-02-08

## 2019-02-08 ENCOUNTER — OFFICE VISIT (OUTPATIENT)
Dept: INTERNAL MEDICINE | Facility: CLINIC | Age: 59
End: 2019-02-08

## 2019-02-08 VITALS
HEIGHT: 63 IN | SYSTOLIC BLOOD PRESSURE: 124 MMHG | TEMPERATURE: 97.9 F | WEIGHT: 151.12 LBS | HEART RATE: 87 BPM | DIASTOLIC BLOOD PRESSURE: 82 MMHG | OXYGEN SATURATION: 99 % | RESPIRATION RATE: 16 BRPM | BODY MASS INDEX: 26.78 KG/M2

## 2019-02-08 DIAGNOSIS — R53.83 FATIGUE, UNSPECIFIED TYPE: ICD-10-CM

## 2019-02-08 DIAGNOSIS — R73.03 PREDIABETES: ICD-10-CM

## 2019-02-08 DIAGNOSIS — E88.81 INSULIN RESISTANCE: ICD-10-CM

## 2019-02-08 DIAGNOSIS — G20 PARKINSON'S DISEASE (HCC): ICD-10-CM

## 2019-02-08 DIAGNOSIS — Z00.00 ANNUAL PHYSICAL EXAM: Primary | ICD-10-CM

## 2019-02-08 DIAGNOSIS — N95.1 MENOPAUSAL VAGINAL DRYNESS: ICD-10-CM

## 2019-02-08 LAB
BILIRUB BLD-MCNC: NEGATIVE MG/DL
CLARITY, POC: CLEAR
COLOR UR: YELLOW
GLUCOSE UR STRIP-MCNC: NEGATIVE MG/DL
KETONES UR QL: NEGATIVE
LEUKOCYTE EST, POC: NEGATIVE
NITRITE UR-MCNC: NEGATIVE MG/ML
PH UR: 5 [PH] (ref 5–8)
PROT UR STRIP-MCNC: NEGATIVE MG/DL
RBC # UR STRIP: NEGATIVE /UL
SP GR UR: 1.01 (ref 1–1.03)
UROBILINOGEN UR QL: NORMAL

## 2019-02-08 PROCEDURE — 81003 URINALYSIS AUTO W/O SCOPE: CPT | Performed by: NURSE PRACTITIONER

## 2019-02-08 PROCEDURE — 99396 PREV VISIT EST AGE 40-64: CPT | Performed by: NURSE PRACTITIONER

## 2019-02-08 RX ORDER — METHOCARBAMOL 500 MG/1
500 TABLET, FILM COATED ORAL DAILY PRN
COMMUNITY
Start: 2019-02-01 | End: 2019-07-04 | Stop reason: HOSPADM
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
## 2019-02-08 NOTE — PATIENT INSTRUCTIONS
Health Maintenance, Female  Adopting a healthy lifestyle and getting preventive care can go a long way to promote health and wellness. Talk with your health care provider about what schedule of regular examinations is right for you. This is a good chance for you to check in with your provider about disease prevention and staying healthy.  In between checkups, there are plenty of things you can do on your own. Experts have done a lot of research about which lifestyle changes and preventive measures are most likely to keep you healthy. Ask your health care provider for more information.  Weight and diet  Eat a healthy diet  · Be sure to include plenty of vegetables, fruits, low-fat dairy products, and lean protein.  · Do not eat a lot of foods high in solid fats, added sugars, or salt.  · Get regular exercise. This is one of the most important things you can do for your health.  ? Most adults should exercise for at least 150 minutes each week. The exercise should increase your heart rate and make you sweat (moderate-intensity exercise).  ? Most adults should also do strengthening exercises at least twice a week. This is in addition to the moderate-intensity exercise.    Maintain a healthy weight  · Body mass index (BMI) is a measurement that can be used to identify possible weight problems. It estimates body fat based on height and weight. Your health care provider can help determine your BMI and help you achieve or maintain a healthy weight.  · For females 20 years of age and older:  ? A BMI below 18.5 is considered underweight.  ? A BMI of 18.5 to 24.9 is normal.  ? A BMI of 25 to 29.9 is considered overweight.  ? A BMI of 30 and above is considered obese.    Watch levels of cholesterol and blood lipids  · You should start having your blood tested for lipids and cholesterol at 20 years of age, then have this test every 5 years.  · You may need to have your cholesterol levels checked more often if:  ? Your lipid or  cholesterol levels are high.  ? You are older than 50 years of age.  ? You are at high risk for heart disease.    Cancer screening  Lung Cancer  · Lung cancer screening is recommended for adults 55-80 years old who are at high risk for lung cancer because of a history of smoking.  · A yearly low-dose CT scan of the lungs is recommended for people who:  ? Currently smoke.  ? Have quit within the past 15 years.  ? Have at least a 30-pack-year history of smoking. A pack year is smoking an average of one pack of cigarettes a day for 1 year.  · Yearly screening should continue until it has been 15 years since you quit.  · Yearly screening should stop if you develop a health problem that would prevent you from having lung cancer treatment.    Breast Cancer  · Practice breast self-awareness. This means understanding how your breasts normally appear and feel.  · It also means doing regular breast self-exams. Let your health care provider know about any changes, no matter how small.  · If you are in your 20s or 30s, you should have a clinical breast exam (CBE) by a health care provider every 1-3 years as part of a regular health exam.  · If you are 40 or older, have a CBE every year. Also consider having a breast X-ray (mammogram) every year.  · If you have a family history of breast cancer, talk to your health care provider about genetic screening.  · If you are at high risk for breast cancer, talk to your health care provider about having an MRI and a mammogram every year.  · Breast cancer gene (BRCA) assessment is recommended for women who have family members with BRCA-related cancers. BRCA-related cancers include:  ? Breast.  ? Ovarian.  ? Tubal.  ? Peritoneal cancers.  · Results of the assessment will determine the need for genetic counseling and BRCA1 and BRCA2 testing.    Cervical Cancer  Your health care provider may recommend that you be screened regularly for cancer of the pelvic organs (ovaries, uterus, and  vagina). This screening involves a pelvic examination, including checking for microscopic changes to the surface of your cervix (Pap test). You may be encouraged to have this screening done every 3 years, beginning at age 21.  · For women ages 30-65, health care providers may recommend pelvic exams and Pap testing every 3 years, or they may recommend the Pap and pelvic exam, combined with testing for human papilloma virus (HPV), every 5 years. Some types of HPV increase your risk of cervical cancer. Testing for HPV may also be done on women of any age with unclear Pap test results.  · Other health care providers may not recommend any screening for nonpregnant women who are considered low risk for pelvic cancer and who do not have symptoms. Ask your health care provider if a screening pelvic exam is right for you.  · If you have had past treatment for cervical cancer or a condition that could lead to cancer, you need Pap tests and screening for cancer for at least 20 years after your treatment. If Pap tests have been discontinued, your risk factors (such as having a new sexual partner) need to be reassessed to determine if screening should resume. Some women have medical problems that increase the chance of getting cervical cancer. In these cases, your health care provider may recommend more frequent screening and Pap tests.    Colorectal Cancer  · This type of cancer can be detected and often prevented.  · Routine colorectal cancer screening usually begins at 50 years of age and continues through 75 years of age.  · Your health care provider may recommend screening at an earlier age if you have risk factors for colon cancer.  · Your health care provider may also recommend using home test kits to check for hidden blood in the stool.  · A small camera at the end of a tube can be used to examine your colon directly (sigmoidoscopy or colonoscopy). This is done to check for the earliest forms of colorectal  cancer.  · Routine screening usually begins at age 50.  · Direct examination of the colon should be repeated every 5-10 years through 75 years of age. However, you may need to be screened more often if early forms of precancerous polyps or small growths are found.    Skin Cancer  · Check your skin from head to toe regularly.  · Tell your health care provider about any new moles or changes in moles, especially if there is a change in a mole's shape or color.  · Also tell your health care provider if you have a mole that is larger than the size of a pencil eraser.  · Always use sunscreen. Apply sunscreen liberally and repeatedly throughout the day.  · Protect yourself by wearing long sleeves, pants, a wide-brimmed hat, and sunglasses whenever you are outside.    Heart disease, diabetes, and high blood pressure  · High blood pressure causes heart disease and increases the risk of stroke. High blood pressure is more likely to develop in:  ? People who have blood pressure in the high end of the normal range (130-139/85-89 mm Hg).  ? People who are overweight or obese.  ? People who are .  · If you are 18-39 years of age, have your blood pressure checked every 3-5 years. If you are 40 years of age or older, have your blood pressure checked every year. You should have your blood pressure measured twice--once when you are at a hospital or clinic, and once when you are not at a hospital or clinic. Record the average of the two measurements. To check your blood pressure when you are not at a hospital or clinic, you can use:  ? An automated blood pressure machine at a pharmacy.  ? A home blood pressure monitor.  · If you are between 55 years and 79 years old, ask your health care provider if you should take aspirin to prevent strokes.  · Have regular diabetes screenings. This involves taking a blood sample to check your fasting blood sugar level.  ? If you are at a normal weight and have a low risk for  diabetes, have this test once every three years after 45 years of age.  ? If you are overweight and have a high risk for diabetes, consider being tested at a younger age or more often.  Preventing infection  Hepatitis B  · If you have a higher risk for hepatitis B, you should be screened for this virus. You are considered at high risk for hepatitis B if:  ? You were born in a country where hepatitis B is common. Ask your health care provider which countries are considered high risk.  ? Your parents were born in a high-risk country, and you have not been immunized against hepatitis B (hepatitis B vaccine).  ? You have HIV or AIDS.  ? You use needles to inject street drugs.  ? You live with someone who has hepatitis B.  ? You have had sex with someone who has hepatitis B.  ? You get hemodialysis treatment.  ? You take certain medicines for conditions, including cancer, organ transplantation, and autoimmune conditions.    Hepatitis C  · Blood testing is recommended for:  ? Everyone born from 1945 through 1965.  ? Anyone with known risk factors for hepatitis C.    Sexually transmitted infections (STIs)  · You should be screened for sexually transmitted infections (STIs) including gonorrhea and chlamydia if:  ? You are sexually active and are younger than 24 years of age.  ? You are older than 24 years of age and your health care provider tells you that you are at risk for this type of infection.  ? Your sexual activity has changed since you were last screened and you are at an increased risk for chlamydia or gonorrhea. Ask your health care provider if you are at risk.  · If you do not have HIV, but are at risk, it may be recommended that you take a prescription medicine daily to prevent HIV infection. This is called pre-exposure prophylaxis (PrEP). You are considered at risk if:  ? You are sexually active and do not regularly use condoms or know the HIV status of your partner(s).  ? You take drugs by injection.  ? You  are sexually active with a partner who has HIV.    Talk with your health care provider about whether you are at high risk of being infected with HIV. If you choose to begin PrEP, you should first be tested for HIV. You should then be tested every 3 months for as long as you are taking PrEP.  Pregnancy  · If you are premenopausal and you may become pregnant, ask your health care provider about preconception counseling.  · If you may become pregnant, take 400 to 800 micrograms (mcg) of folic acid every day.  · If you want to prevent pregnancy, talk to your health care provider about birth control (contraception).  Osteoporosis and menopause  · Osteoporosis is a disease in which the bones lose minerals and strength with aging. This can result in serious bone fractures. Your risk for osteoporosis can be identified using a bone density scan.  · If you are 65 years of age or older, or if you are at risk for osteoporosis and fractures, ask your health care provider if you should be screened.  · Ask your health care provider whether you should take a calcium or vitamin D supplement to lower your risk for osteoporosis.  · Menopause may have certain physical symptoms and risks.  · Hormone replacement therapy may reduce some of these symptoms and risks.  Talk to your health care provider about whether hormone replacement therapy is right for you.  Follow these instructions at home:  · Schedule regular health, dental, and eye exams.  · Stay current with your immunizations.  · Do not use any tobacco products including cigarettes, chewing tobacco, or electronic cigarettes.  · If you are pregnant, do not drink alcohol.  · If you are breastfeeding, limit how much and how often you drink alcohol.  · Limit alcohol intake to no more than 1 drink per day for nonpregnant women. One drink equals 12 ounces of beer, 5 ounces of wine, or 1½ ounces of hard liquor.  · Do not use street drugs.  · Do not share needles.  · Ask your health care  provider for help if you need support or information about quitting drugs.  · Tell your health care provider if you often feel depressed.  · Tell your health care provider if you have ever been abused or do not feel safe at home.  This information is not intended to replace advice given to you by your health care provider. Make sure you discuss any questions you have with your health care provider.  Document Released: 07/02/2012 Document Revised: 05/25/2017 Document Reviewed: 09/20/2016  [employer] Patient Education © 2018 [employer]

## 2019-02-11 PROBLEM — M25.50 JOINT PAIN: Status: ACTIVE | Noted: 2019-02-11

## 2019-02-11 PROBLEM — M54.2 CHRONIC NECK PAIN: Status: ACTIVE | Noted: 2019-02-11

## 2019-02-11 PROBLEM — J45.909 ASTHMA: Status: ACTIVE | Noted: 2019-02-11

## 2019-02-11 PROBLEM — I10 ESSENTIAL HYPERTENSION: Status: ACTIVE | Noted: 2019-02-11

## 2019-02-11 PROBLEM — M19.91 LOCALIZED, PRIMARY OSTEOARTHRITIS: Status: ACTIVE | Noted: 2019-02-11

## 2019-02-11 PROBLEM — K11.7 XEROSTOMIA: Status: ACTIVE | Noted: 2019-02-11

## 2019-02-11 PROBLEM — M54.50 LOW BACK PAIN: Status: ACTIVE | Noted: 2019-02-11

## 2019-02-11 PROBLEM — M76.899 ENTHESOPATHY OF HIP REGION: Status: ACTIVE | Noted: 2019-02-11

## 2019-02-11 PROBLEM — G20.A1 PARKINSON'S DISEASE: Status: ACTIVE | Noted: 2019-02-11

## 2019-02-11 PROBLEM — G20 PARKINSON'S DISEASE: Status: ACTIVE | Noted: 2019-02-11

## 2019-02-11 PROBLEM — G89.29 CHRONIC NECK PAIN: Status: ACTIVE | Noted: 2019-02-11

## 2019-02-11 PROBLEM — F32.A DEPRESSIVE DISORDER: Status: ACTIVE | Noted: 2019-02-11

## 2019-02-11 PROBLEM — F41.9 ANXIETY: Status: ACTIVE | Noted: 2019-02-11

## 2019-02-11 PROBLEM — R06.02 SHORTNESS OF BREATH: Status: RESOLVED | Noted: 2018-02-06 | Resolved: 2019-02-11

## 2019-02-11 PROBLEM — G47.00 INSOMNIA: Status: ACTIVE | Noted: 2019-02-11

## 2019-02-12 LAB
25(OH)D3+25(OH)D2 SERPL-MCNC: 32 NG/ML
ALBUMIN SERPL-MCNC: 4.1 G/DL (ref 3.5–5)
ALBUMIN/GLOB SERPL: 1.7 G/DL (ref 1–2)
ALP SERPL-CCNC: 88 U/L (ref 38–126)
ALT SERPL-CCNC: 30 U/L (ref 13–69)
AST SERPL-CCNC: 30 U/L (ref 15–46)
BASOPHILS # BLD AUTO: 0.06 10*3/MM3 (ref 0–0.2)
BASOPHILS NFR BLD AUTO: 1.4 % (ref 0–2.5)
BILIRUB SERPL-MCNC: 0.4 MG/DL (ref 0.2–1.3)
BUN SERPL-MCNC: 12 MG/DL (ref 7–20)
BUN/CREAT SERPL: 15 (ref 7.1–23.5)
CALCIUM SERPL-MCNC: 9.3 MG/DL (ref 8.4–10.2)
CHLORIDE SERPL-SCNC: 105 MMOL/L (ref 98–107)
CHOLEST SERPL-MCNC: 155 MG/DL (ref 0–199)
CO2 SERPL-SCNC: 26 MMOL/L (ref 26–30)
CREAT SERPL-MCNC: 0.8 MG/DL (ref 0.6–1.3)
EOSINOPHIL # BLD AUTO: 0.28 10*3/MM3 (ref 0–0.7)
EOSINOPHIL NFR BLD AUTO: 6.6 % (ref 0–7)
ERYTHROCYTE [DISTWIDTH] IN BLOOD BY AUTOMATED COUNT: 14.3 % (ref 11.5–14.5)
FERRITIN SERPL-MCNC: 6.74 NG/ML (ref 11.1–264)
GLOBULIN SER CALC-MCNC: 2.4 GM/DL
GLUCOSE SERPL-MCNC: 78 MG/DL (ref 74–98)
HBA1C MFR BLD: 5.7 %
HCT VFR BLD AUTO: 33.7 % (ref 37–47)
HDLC SERPL-MCNC: 70 MG/DL (ref 40–60)
HGB BLD-MCNC: 10.8 G/DL (ref 12–16)
IMM GRANULOCYTES # BLD AUTO: 0.01 10*3/MM3 (ref 0–0.06)
IMM GRANULOCYTES NFR BLD AUTO: 0.2 % (ref 0–0.6)
LDLC SERPL CALC-MCNC: 73 MG/DL (ref 0–99)
LYMPHOCYTES # BLD AUTO: 1.57 10*3/MM3 (ref 0.6–3.4)
LYMPHOCYTES NFR BLD AUTO: 37.1 % (ref 10–50)
MCH RBC QN AUTO: 28.6 PG (ref 27–31)
MCHC RBC AUTO-ENTMCNC: 32 G/DL (ref 30–37)
MCV RBC AUTO: 89.2 FL (ref 81–99)
MONOCYTES # BLD AUTO: 0.31 10*3/MM3 (ref 0–0.9)
MONOCYTES NFR BLD AUTO: 7.3 % (ref 0–12)
NEUTROPHILS # BLD AUTO: 2 10*3/MM3 (ref 2–6.9)
NEUTROPHILS NFR BLD AUTO: 47.4 % (ref 37–80)
NRBC BLD AUTO-RTO: 0 /100 WBC (ref 0–0)
PLATELET # BLD AUTO: 194 10*3/MM3 (ref 130–400)
POTASSIUM SERPL-SCNC: 3.7 MMOL/L (ref 3.5–5.1)
PROT SERPL-MCNC: 6.5 G/DL (ref 6.3–8.2)
RBC # BLD AUTO: 3.78 10*6/MM3 (ref 4.2–5.4)
SODIUM SERPL-SCNC: 139 MMOL/L (ref 137–145)
T4 FREE SERPL-MCNC: 1.08 NG/DL (ref 0.78–2.19)
TRIGL SERPL-MCNC: 60 MG/DL
TSH SERPL DL<=0.005 MIU/L-ACNC: 2.17 MIU/ML (ref 0.47–4.68)
VIT B12 SERPL-MCNC: 703 PG/ML (ref 239–931)
VLDLC SERPL CALC-MCNC: 12 MG/DL
WBC # BLD AUTO: 4.23 10*3/MM3 (ref 4.8–10.8)

## 2019-02-13 NOTE — PROGRESS NOTES
Please contact patient and let her know lab results look okay with exception of low ferritin level and recommend she take iron supplement or ensure she is eating foods high in iron.  Her hemoglobin A1c is 5.7 which is the prediabetes range tell her to continue dietary modifications and exercising as she has been it did improve from 5.9.

## 2019-02-18 ENCOUNTER — TELEPHONE (OUTPATIENT)
Dept: NEUROLOGY | Facility: CLINIC | Age: 59
End: 2019-02-18

## 2019-02-18 PROBLEM — I10 ESSENTIAL HYPERTENSION: Status: RESOLVED | Noted: 2019-02-11 | Resolved: 2019-02-18

## 2019-02-19 ENCOUNTER — OFFICE VISIT (OUTPATIENT)
Dept: INTERNAL MEDICINE | Facility: CLINIC | Age: 59
End: 2019-02-19

## 2019-02-19 VITALS
RESPIRATION RATE: 16 BRPM | DIASTOLIC BLOOD PRESSURE: 80 MMHG | TEMPERATURE: 98.8 F | OXYGEN SATURATION: 99 % | HEART RATE: 93 BPM | WEIGHT: 149.8 LBS | BODY MASS INDEX: 26.54 KG/M2 | HEIGHT: 63 IN | SYSTOLIC BLOOD PRESSURE: 110 MMHG

## 2019-02-19 DIAGNOSIS — J06.9 UPPER RESPIRATORY TRACT INFECTION, UNSPECIFIED TYPE: Primary | ICD-10-CM

## 2019-02-19 DIAGNOSIS — H66.93 BILATERAL OTITIS MEDIA, UNSPECIFIED OTITIS MEDIA TYPE: ICD-10-CM

## 2019-02-19 DIAGNOSIS — R05.9 COUGH: ICD-10-CM

## 2019-02-19 LAB
EXPIRATION DATE: NORMAL
FLUAV AG NPH QL: NEGATIVE
FLUBV AG NPH QL: NEGATIVE
INTERNAL CONTROL: NORMAL
Lab: NORMAL

## 2019-02-19 PROCEDURE — 99213 OFFICE O/P EST LOW 20 MIN: CPT | Performed by: PHYSICIAN ASSISTANT

## 2019-02-19 PROCEDURE — 87804 INFLUENZA ASSAY W/OPTIC: CPT | Performed by: PHYSICIAN ASSISTANT

## 2019-02-19 PROCEDURE — 87880 STREP A ASSAY W/OPTIC: CPT | Performed by: PHYSICIAN ASSISTANT

## 2019-02-19 RX ORDER — CEFDINIR 300 MG/1
300 CAPSULE ORAL 2 TIMES DAILY
Qty: 14 CAPSULE | Refills: 0 | Status: SHIPPED | OUTPATIENT
Start: 2019-02-19 | End: 2019-02-26

## 2019-02-19 NOTE — PROGRESS NOTES
Chief Complaint   Patient presents with   • Cough     Pt c/o cough and bilateral ear ache X 2-3 days       Subjective   Kelly Meza is a 59 y.o. female    History of Present Illness     Cough and ear ache:  Symptoms began Friday with low grade fever, back aches, sore throat, and bilateral ear pain.  She has had nasal congestion and post nasal drip.  Cough is dry and denies any production.  Associated symptoms of nausea.  Denies any emesis or diarrhea.  Patient has not used any medication for symptom relief.  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     Past Medical History:   Diagnosis Date   • Asthma    • Basal cell carcinoma of skin    • Bilateral ovarian cysts    • Cervical spine disease    • GERD (gastroesophageal reflux disease)    • Heart murmur    • Migraine    • Ovarian cyst    • Parkinson's disease (CMS/HCC)    • Skin cancer      Past Surgical History:   Procedure Laterality Date   • BREAST LUMPECTOMY Right 2009   • FINGER SURGERY     • ROTATOR CUFF REPAIR Right    • SKIN CANCER EXCISION     • SPINAL FUSION  12/07/2016    with Bone spur shaved off       Family History   Problem Relation Age of Onset   • Arthritis Mother    • Cancer Mother    • Hypertension Mother    • Thyroid disease Mother    • Cancer Father    • Liver disease Father    • Osteoporosis Maternal Grandmother    • Hypertension Maternal Grandfather    • Heart attack Maternal Grandfather    • Heart disease Maternal Grandfather    • Kidney disease Paternal Grandmother      Social History     Socioeconomic History   • Marital status:      Spouse name: Not on file   • Number of children: Not on file   • Years of education: Not on file   • Highest education level: Not on file   Social Needs   • Financial resource strain: Not on file   • Food insecurity - worry: Not on file   • Food insecurity - inability: Not on file   • Transportation needs - medical: Not on file   •  Transportation needs - non-medical: Not on file   Occupational History   • Not on file   Tobacco Use   • Smoking status: Former Smoker     Types: Cigarettes     Last attempt to quit: 1986     Years since quittin.1   • Smokeless tobacco: Never Used   Substance and Sexual Activity   • Alcohol use: No   • Drug use: No   • Sexual activity: Not on file   Other Topics Concern   • Not on file   Social History Narrative   • Not on file     Allergies   Allergen Reactions   • Ciprofloxacin Anaphylaxis   • Latex Hives and Shortness Of Breath   • Amoxicillin Nausea Only   • Codeine Sulfate Nausea And Vomiting   • Grass    • Other      Animal dander   • Paxil [Paroxetine Hcl] Other (See Comments)     Noticeable mood changes   • Pollen Extract      Review of Systems   Constitutional: Positive for chills, fatigue and fever.   HENT: Positive for congestion, ear pain (fullness), postnasal drip, sinus pressure and sore throat. Negative for rhinorrhea and sneezing.    Respiratory: Positive for cough. Negative for chest tightness and shortness of breath.    Cardiovascular: Negative for chest pain and leg swelling.   Gastrointestinal: Positive for nausea. Negative for diarrhea and vomiting.   Neurological: Negative for dizziness, light-headedness and headache.     Objective     Vitals:    19 1600   BP: 110/80   Pulse: 93   Resp: 16   Temp: 98.8 °F (37.1 °C)   SpO2: 99%       Physical Exam   Constitutional: She is oriented to person, place, and time. She appears well-developed and well-nourished. No distress.   HENT:   Head: Normocephalic and atraumatic.   Right Ear: External ear and ear canal normal. No tenderness. Tympanic membrane is erythematous and bulging.   Left Ear: External ear and ear canal normal. No tenderness. Tympanic membrane is bulging. Tympanic membrane is not erythematous.   Nose: Congestion present. No rhinorrhea.   Mouth/Throat: Uvula is midline and mucous membranes are normal. Posterior oropharyngeal  erythema present. No oropharyngeal exudate or posterior oropharyngeal edema.   Eyes: Conjunctivae and EOM are normal. Pupils are equal, round, and reactive to light.   Neck: Normal range of motion. Neck supple.   Cardiovascular: Normal rate, regular rhythm and normal heart sounds. Exam reveals no gallop and no friction rub.   No murmur heard.  Pulmonary/Chest: Effort normal and breath sounds normal. No respiratory distress. She has no wheezes. She has no rales.   Neurological: She is alert and oriented to person, place, and time.   Skin: Skin is warm and dry. Capillary refill takes less than 2 seconds. She is not diaphoretic.   Psychiatric: She has a normal mood and affect. Her behavior is normal.   Nursing note and vitals reviewed.      Results for orders placed or performed in visit on 02/19/19   POC Influenza A / B   Result Value Ref Range    Rapid Influenza A Ag Negative Negative    Rapid Influenza B Ag Negative Negative    Internal Control Passed Passed    Lot Number [id number]     Expiration Date 09/21/21    POC Rapid Strep A   Result Value Ref Range    Rapid Strep A Screen Negative Negative, VALID, INVALID, Not Performed    Internal Control Passed Passed    Lot Number [id number]     Expiration Date 12/4/2021        Assessment/Plan     Kelly was seen today for cough.    Diagnoses and all orders for this visit:    Upper respiratory tract infection, unspecified type  -     RSS negative, rapid flu negative.  Start cefdinir.  Advised patient to push fluids, tylenol as needed.  If symptoms are not improving or if they worsen, RTC.  -     POC Influenza A / B  -     cefdinir (OMNICEF) 300 MG capsule; Take 1 capsule by mouth 2 (Two) Times a Day for 7 days.    Bilateral otitis media, unspecified otitis media type  -     cefdinir (OMNICEF) 300 MG capsule; Take 1 capsule by mouth 2 (Two) Times a Day for 7 days.    Cough  -     POC Rapid Strep A        Return if symptoms worsen or fail to improve.    Ana Perry PA-C

## 2019-02-20 LAB
EXPIRATION DATE: NORMAL
INTERNAL CONTROL: NORMAL
Lab: NORMAL
S PYO AG THROAT QL: NEGATIVE

## 2019-02-25 RX ORDER — MIRTAZAPINE 30 MG/1
TABLET, FILM COATED ORAL
Qty: 30 TABLET | Refills: 0 | OUTPATIENT
Start: 2019-02-25

## 2019-02-26 RX ORDER — QUETIAPINE FUMARATE 100 MG/1
TABLET, FILM COATED ORAL
Qty: 60 TABLET | Refills: 5 | Status: SHIPPED | OUTPATIENT
Start: 2019-02-26 | End: 2019-09-09 | Stop reason: SDUPTHER

## 2019-03-01 RX ORDER — QUETIAPINE FUMARATE 100 MG/1
TABLET, FILM COATED ORAL
Qty: 60 TABLET | Refills: 5 | OUTPATIENT
Start: 2019-03-01

## 2019-03-27 RX ORDER — MIRTAZAPINE 30 MG/1
TABLET, FILM COATED ORAL
Qty: 30 TABLET | Refills: 0 | OUTPATIENT
Start: 2019-03-27

## 2019-03-27 RX ORDER — SPIRONOLACTONE 50 MG/1
TABLET, FILM COATED ORAL
Qty: 60 TABLET | Refills: 10 | OUTPATIENT
Start: 2019-03-27

## 2019-04-13 DIAGNOSIS — N95.1 MENOPAUSAL VAGINAL DRYNESS: ICD-10-CM

## 2019-04-13 RX ORDER — PRASTERONE 6.5 MG/1
INSERT VAGINAL
Qty: 30 EACH | Refills: 1 | Status: SHIPPED | OUTPATIENT
Start: 2019-04-13 | End: 2020-03-04

## 2019-04-30 ENCOUNTER — OFFICE VISIT (OUTPATIENT)
Dept: ENDOCRINOLOGY | Facility: CLINIC | Age: 59
End: 2019-04-30

## 2019-04-30 VITALS
WEIGHT: 148.4 LBS | BODY MASS INDEX: 26.29 KG/M2 | SYSTOLIC BLOOD PRESSURE: 128 MMHG | OXYGEN SATURATION: 99 % | HEART RATE: 78 BPM | DIASTOLIC BLOOD PRESSURE: 78 MMHG

## 2019-04-30 DIAGNOSIS — E88.81 INSULIN RESISTANCE: Primary | ICD-10-CM

## 2019-04-30 DIAGNOSIS — R63.5 WEIGHT GAIN: ICD-10-CM

## 2019-04-30 LAB
GLUCOSE BLDC GLUCOMTR-MCNC: 103 MG/DL (ref 70–130)
HBA1C MFR BLD: 5 %

## 2019-04-30 PROCEDURE — 99213 OFFICE O/P EST LOW 20 MIN: CPT | Performed by: INTERNAL MEDICINE

## 2019-04-30 PROCEDURE — 83036 HEMOGLOBIN GLYCOSYLATED A1C: CPT | Performed by: INTERNAL MEDICINE

## 2019-04-30 PROCEDURE — 82962 GLUCOSE BLOOD TEST: CPT | Performed by: INTERNAL MEDICINE

## 2019-04-30 NOTE — PROGRESS NOTES
Subjective:   Prediabetes        Kelly Meza is a 59 y.o. female who is being seen for follow-up for hyperglycemia, abnormal thyroid function tests and obesity.  Patient reported difficulty with weight loss despite diet and exercises. She has tried different weight loss programs, contrave with minimal effect. She had some features of insulin resistance  5/2018 we have started Saxenda and she tolerated medication well. She was able to increased the dose slowly to current 3 mg. Lost >50 lbs. Noted reduced appetite. Almost at goal of weight now, just few lbs above. Feels that     Tolerated well, but noticed platoe in the weight in the last 3 months. No side effects.         Review of Systems  Review of Systems   Constitutional: Positive for fatigue. Negative for activity change, chills and diaphoresis.   HENT: Negative for congestion, ear pain, facial swelling, hearing loss, postnasal drip and trouble swallowing.    Eyes: Negative for visual disturbance.   Respiratory: Negative for cough.    Cardiovascular: Negative for chest pain, palpitations and leg swelling.   Gastrointestinal: Positive for constipation and diarrhea. Negative for abdominal distention, abdominal pain, nausea and vomiting.   Endocrine: Positive for cold intolerance and heat intolerance.        As listed in HPI   Genitourinary: Negative.  Negative for menstrual problem (she had menses up until 52 yo when started OCP. ).   Musculoskeletal: Positive for back pain and neck pain. Negative for arthralgias, joint swelling, myalgias and neck stiffness.   Skin: Negative.         Facial hair   Allergic/Immunologic: Positive for environmental allergies.   Neurological: Positive for weakness, numbness (tingling in the right arm>left. ) and headaches. Negative for dizziness, tremors, syncope and light-headedness.   Hematological: Negative.    Psychiatric/Behavioral: Positive for sleep disturbance. The patient is nervous/anxious.    All other systems  reviewed and are negative.     Current medications:  Current Outpatient Medications   Medication Sig Dispense Refill   • albuterol (PROVENTIL) (2.5 MG/3ML) 0.083% nebulizer solution      • ASHLYNA 0.15-0.03 &0.01 MG tablet TAKE 1 TABLET BY MOUTH ONE TIME A DAY  30 each 1   • carbidopa-levodopa (SINEMET)  MG per tablet Take 1 tablet by mouth 3 (Three) Times a Day. (Patient taking differently: Take 1.5 tablets by mouth 4 (Four) Times a Day.) 90 tablet 6   • clonazePAM (KlonoPIN) 1 MG tablet Take 1 mg by mouth At Night As Needed.     • DULERA 200-5 MCG/ACT inhaler Use as directed     • glycopyrrolate (ROBINUL) 2 MG tablet 1 tablet 2 (Two) Times a Day.     • Insulin Pen Needle (BD PEN NEEDLE DEIRDRE U/F) 32G X 4 MM misc 1 each Daily. 30 each 5   • Insulin Pen Needle (BD PEN NEEDLE DEIRDRE U/F) 32G X 4 MM misc 1 each Daily. 30 each 5   • Insulin Pen Needle (BD PEN NEEDLE DEIRDRE U/F) 32G X 4 MM misc 1 each Daily. 30 each 5   • INTRAROSA 6.5 MG insert INSERT 1 VAGINALLY ONE TIME A DAY  30 each 1   • Liraglutide -Weight Management (SAXENDA) 18 MG/3ML solution pen-injector Inject 3 mg under the skin into the appropriate area as directed Daily. 4 pen 6   • methocarbamol (ROBAXIN) 500 MG tablet Take 500 mg by mouth Daily As Needed.     • Multiple Vitamins-Minerals (MULTIVITAMIN ADULT PO) Take  by mouth.     • QUEtiapine (SEROquel) 100 MG tablet One to Two pills 2 hours before bedtime 60 tablet 5   • ranitidine (ZANTAC) 75 MG/5ML syrup Take 75 mg by mouth 2 (Two) Times a Day As Needed.     • selegiline (ELDEPRYL) 5 MG tablet Take 5 mg by mouth 2 (Two) Times a Day With Meals.     • trihexyphenidyl (ARTANE) 2 MG tablet 1 tablet 2 (Two) Times a Day With Meals. Decrease by 1 tab weekly to taper off.     • VENTOLIN  (90 BASE) MCG/ACT inhaler PRN     • XOLAIR 150 MG injection        No current facility-administered medications for this visit.        PMH  The following portions of the patient's history were reviewed and updated  as appropriate: allergies, current medications, past family history, past medical history, past social history, past surgical history and problem list.      Objective:     Vitals:    04/30/19 1609   BP: 128/78   Pulse: 78   SpO2: 99%   Body mass index is 26.29 kg/m².  Physical Exam   Constitutional: She is oriented to person, place, and time. She appears well-developed and well-nourished.   HENT:   Head: Normocephalic and atraumatic.   Eyes: Conjunctivae are normal.   Neck: No thyromegaly present.   The neck is supple and symmetric   Cardiovascular: Normal rate, regular rhythm and normal heart sounds.   Pulmonary/Chest: Effort normal and breath sounds normal.   Musculoskeletal: She exhibits no edema.   Lymphadenopathy:     She has no cervical adenopathy.   Neurological: She is alert and oriented to person, place, and time.   Skin: Skin is warm and dry. No rash noted.   hirsutism of the chin and cheeks, neck.    Psychiatric: She has a normal mood and affect. Thought content normal.   Vitals reviewed.      LABS AND IMAGING        Results for orders placed or performed in visit on 04/30/19   POC Glycosylated Hemoglobin (Hb A1C)   Result Value Ref Range    Hemoglobin A1C 5.0 %   POCT Glucose   Result Value Ref Range    Glucose 103 70 - 130 mg/dL        Assessment:        Problem List Items Addressed This Visit        Other    Weight gain    Relevant Medications    Liraglutide -Weight Management (SAXENDA) 18 MG/3ML solution pen-injector    Insulin resistance - Primary    Relevant Medications    Liraglutide -Weight Management (SAXENDA) 18 MG/3ML solution pen-injector    Other Relevant Orders    POC Glycosylated Hemoglobin (Hb A1C) (Completed)    POCT Glucose (Completed)               Plan:         Continue with the weight loss program and Saxenda 3 mg daily. She is currently almost at the goal weight.     Dietary recommendations revisited.       Follow-up in 6 months

## 2019-05-06 DIAGNOSIS — E88.81 INSULIN RESISTANCE: ICD-10-CM

## 2019-05-06 DIAGNOSIS — R63.5 WEIGHT GAIN: ICD-10-CM

## 2019-05-07 RX ORDER — MIRTAZAPINE 30 MG/1
TABLET, FILM COATED ORAL
Qty: 30 TABLET | Refills: 0 | OUTPATIENT
Start: 2019-05-07

## 2019-05-08 ENCOUNTER — TELEPHONE (OUTPATIENT)
Dept: ENDOCRINOLOGY | Facility: CLINIC | Age: 59
End: 2019-05-08

## 2019-05-08 NOTE — TELEPHONE ENCOUNTER
CMM called to state that patient would need Renewal PA for Saxenda. Was previously state refill too soon.   Stated to call: [phone]. [facility].

## 2019-05-10 RX ORDER — SPIRONOLACTONE 50 MG/1
TABLET, FILM COATED ORAL
Qty: 60 TABLET | Refills: 10 | OUTPATIENT
Start: 2019-05-10 | End: 2019-07-04 | Stop reason: HOSPADM

## 2019-05-10 RX ORDER — LEVONORGESTREL AND ETHINYL ESTRADIOL 150-30(84)
KIT ORAL
Qty: 30 EACH | Refills: 6 | OUTPATIENT
Start: 2019-05-10 | End: 2019-07-04 | Stop reason: HOSPADM

## 2019-05-22 DIAGNOSIS — E88.81 INSULIN RESISTANCE: ICD-10-CM

## 2019-05-22 DIAGNOSIS — R63.5 WEIGHT GAIN: ICD-10-CM

## 2019-06-05 RX ORDER — SPIRONOLACTONE 50 MG/1
TABLET, FILM COATED ORAL
Qty: 60 TABLET | Refills: 10 | OUTPATIENT
Start: 2019-06-05

## 2019-06-05 RX ORDER — MIRTAZAPINE 30 MG/1
TABLET, FILM COATED ORAL
Qty: 30 TABLET | Refills: 0 | OUTPATIENT
Start: 2019-06-05 | End: 2019-07-04 | Stop reason: HOSPADM

## 2019-06-05 RX ORDER — LEVONORGESTREL AND ETHINYL ESTRADIOL 150-30(84)
KIT ORAL
Refills: 0 | OUTPATIENT
Start: 2019-06-05

## 2019-07-05 RX ORDER — LEVONORGESTREL AND ETHINYL ESTRADIOL 150-30(84)
KIT ORAL
Qty: 30 EACH | Refills: 0 | Status: SHIPPED | OUTPATIENT
Start: 2019-07-05 | End: 2019-08-22

## 2019-07-05 RX ORDER — MIRTAZAPINE 30 MG/1
TABLET, FILM COATED ORAL
Qty: 30 TABLET | Refills: 0 | Status: SHIPPED | OUTPATIENT
Start: 2019-07-05 | End: 2020-12-08

## 2019-07-05 RX ORDER — SPIRONOLACTONE 50 MG/1
TABLET, FILM COATED ORAL
Qty: 60 TABLET | Refills: 3 | Status: SHIPPED | OUTPATIENT
Start: 2019-07-05 | End: 2019-08-16

## 2019-08-22 ENCOUNTER — OFFICE VISIT (OUTPATIENT)
Dept: NEUROLOGY | Facility: CLINIC | Age: 59
End: 2019-08-22

## 2019-08-22 VITALS
WEIGHT: 151 LBS | BODY MASS INDEX: 25.92 KG/M2 | OXYGEN SATURATION: 99 % | HEART RATE: 100 BPM | SYSTOLIC BLOOD PRESSURE: 100 MMHG | DIASTOLIC BLOOD PRESSURE: 60 MMHG | TEMPERATURE: 97.2 F

## 2019-08-22 DIAGNOSIS — G20 PD (PARKINSON'S DISEASE) (HCC): Primary | ICD-10-CM

## 2019-08-22 PROCEDURE — 99214 OFFICE O/P EST MOD 30 MIN: CPT | Performed by: NURSE PRACTITIONER

## 2019-08-22 RX ORDER — RANITIDINE 150 MG/1
150 TABLET ORAL 2 TIMES DAILY
COMMUNITY
End: 2020-03-04

## 2019-08-22 RX ORDER — TIZANIDINE 4 MG/1
4 TABLET ORAL 3 TIMES DAILY
Qty: 90 TABLET | Refills: 1 | Status: SHIPPED | OUTPATIENT
Start: 2019-08-22 | End: 2020-08-21

## 2019-08-22 RX ORDER — CARBIDOPA AND LEVODOPA 50; 200 MG/1; MG/1
1 TABLET, EXTENDED RELEASE ORAL NIGHTLY
Qty: 30 TABLET | Refills: 6 | Status: SHIPPED | OUTPATIENT
Start: 2019-08-22 | End: 2020-11-26

## 2019-08-22 RX ORDER — CARBIDOPA AND LEVODOPA 25; 100 MG/1; MG/1
1 TABLET, EXTENDED RELEASE ORAL
Refills: 3 | COMMUNITY
Start: 2019-06-06 | End: 2019-08-22

## 2019-08-22 RX ORDER — MUPIROCIN CALCIUM 20 MG/G
CREAM TOPICAL
Refills: 0 | COMMUNITY
Start: 2019-05-17

## 2019-08-24 DIAGNOSIS — M25.511 CHRONIC RIGHT SHOULDER PAIN: Primary | ICD-10-CM

## 2019-08-24 DIAGNOSIS — G89.29 CHRONIC RIGHT SHOULDER PAIN: Primary | ICD-10-CM

## 2019-09-09 RX ORDER — QUETIAPINE FUMARATE 100 MG/1
TABLET, FILM COATED ORAL
Qty: 60 TABLET | Refills: 4 | Status: SHIPPED | OUTPATIENT
Start: 2019-09-09

## 2019-09-10 DIAGNOSIS — G47.9 SLEEP DISTURBANCES: Primary | ICD-10-CM

## 2019-09-24 DIAGNOSIS — R63.5 WEIGHT GAIN: ICD-10-CM

## 2019-09-24 DIAGNOSIS — E88.81 INSULIN RESISTANCE: ICD-10-CM

## 2019-10-07 DIAGNOSIS — Z12.31 ENCOUNTER FOR SCREENING MAMMOGRAM FOR MALIGNANT NEOPLASM OF BREAST: Primary | ICD-10-CM

## 2019-10-12 DIAGNOSIS — N95.1 MENOPAUSAL VAGINAL DRYNESS: ICD-10-CM

## 2019-10-30 ENCOUNTER — OFFICE VISIT (OUTPATIENT)
Dept: ENDOCRINOLOGY | Facility: CLINIC | Age: 59
End: 2019-10-30

## 2019-10-30 VITALS
HEIGHT: 64 IN | SYSTOLIC BLOOD PRESSURE: 118 MMHG | OXYGEN SATURATION: 97 % | DIASTOLIC BLOOD PRESSURE: 64 MMHG | HEART RATE: 54 BPM | BODY MASS INDEX: 27.01 KG/M2 | WEIGHT: 158.2 LBS

## 2019-10-30 DIAGNOSIS — E28.2 PCOS (POLYCYSTIC OVARIAN SYNDROME): Primary | ICD-10-CM

## 2019-10-30 DIAGNOSIS — L68.0 HIRSUTISM: ICD-10-CM

## 2019-10-30 DIAGNOSIS — R63.5 WEIGHT GAIN: ICD-10-CM

## 2019-10-30 PROCEDURE — 99213 OFFICE O/P EST LOW 20 MIN: CPT | Performed by: INTERNAL MEDICINE

## 2019-10-30 RX ORDER — PHENTERMINE HYDROCHLORIDE 37.5 MG/1
37.5 TABLET ORAL
Qty: 30 TABLET | Refills: 1 | Status: SHIPPED | OUTPATIENT
Start: 2019-10-30 | End: 2020-03-04

## 2019-10-30 NOTE — PROGRESS NOTES
Subjective:   Polycystic Ovary Syndrome (Follow UP)        Kelly Meza is a 59 y.o. female who is being seen for follow-up for hyperglycemia, abnormal thyroid function tests and obesity.  Patient reported difficulty with weight loss despite diet and exercises. She has tried different weight loss programs, contrave with minimal effect. She had some features of insulin resistance  5/2018 we have started Saxenda and she tolerated medication well. She was able to increased the dose slowly to current 3 mg. Lost >50 lbs. Now noticed plato effect  In July she had hospitalization when wa snot able to swallow or breath - esophageal issue. She received steroid course IV followed by PO for 10 days. SHe feels that since then the weight has increased despite her best efforts and Saxenda.     Patient had left thumb surgery - just had cast removed.      Review of Systems  Review of Systems   Constitutional: Positive for fatigue. Negative for activity change, chills and diaphoresis.   HENT: Negative for congestion, ear pain, facial swelling, hearing loss, postnasal drip and trouble swallowing.    Eyes: Negative for visual disturbance.   Respiratory: Negative for cough.    Cardiovascular: Negative for chest pain, palpitations and leg swelling.   Gastrointestinal: Positive for constipation and diarrhea. Negative for abdominal distention, abdominal pain, nausea and vomiting.   Endocrine:        As listed in HPI   Genitourinary: Negative.  Negative for menstrual problem (she had menses up until 50 yo when started OCP. ).   Musculoskeletal: Positive for back pain and neck pain. Negative for arthralgias, joint swelling, myalgias and neck stiffness.   Skin: Negative.         Facial hair   Allergic/Immunologic: Positive for environmental allergies.   Neurological: Positive for weakness, numbness (tingling in the right arm>left. ) and headaches. Negative for dizziness, tremors, syncope and light-headedness.   Hematological: Negative.     Psychiatric/Behavioral: Positive for sleep disturbance. The patient is nervous/anxious.    All other systems reviewed and are negative.     Current medications:  Current Outpatient Medications   Medication Sig Dispense Refill   • carbidopa-levodopa CR (SINEMET CR)  MG per CR tablet Take 1 tablet by mouth Every Night. 30 tablet 6   • clonazePAM (KlonoPIN) 1 MG tablet Take 1 mg by mouth At Night As Needed.     • Insulin Pen Needle (BD PEN NEEDLE DEIRDRE U/F) 32G X 4 MM misc 1 each Daily. 30 each 11   • INTRAROSA 6.5 MG insert INSERT 1 VAGINALLY ONE TIME A DAY  30 each 1   • Liraglutide -Weight Management (SAXENDA) 18 MG/3ML solution pen-injector Inject 3 mg under the skin into the appropriate area as directed Daily. 4 pen 6   • metroNIDAZOLE (METROCREAM) 0.75 % cream apply TOPICALLY to face ONCE DAILY as directed  99   • mirtazapine (REMERON) 30 MG tablet TAKE 1 TABLET BY MOUTH IN THE EVENING  30 tablet 0   • Multiple Vitamins-Minerals (MULTIVITAMIN ADULT PO) Take  by mouth.     • mupirocin (BACTROBAN) 2 % cream APPLY TOPICALLY TO AFFECTED AREA(S) TWICE A DAY  0   • QUEtiapine (SEROquel) 100 MG tablet TAKE 1 TO 2 TABLETS BY MOUTH 2 HOURS BEFORE  BEDTIME 60 tablet 4   • raNITIdine (ZANTAC) 150 MG tablet Take 150 mg by mouth 2 (Two) Times a Day.     • tiZANidine (ZANAFLEX) 4 MG tablet Take 1 tablet by mouth 3 (Three) Times a Day. 90 tablet 1   • phentermine (ADIPEX-P) 37.5 MG tablet Take 1 tablet by mouth Every Morning Before Breakfast. 30 tablet 1     No current facility-administered medications for this visit.        PMH  The following portions of the patient's history were reviewed and updated as appropriate: allergies, current medications, past family history, past medical history, past social history, past surgical history and problem list.      Objective:     Vitals:    10/30/19 1532   BP: 118/64   Pulse: 54   SpO2: 97%   Body mass index is 27.15 kg/m².  Physical Exam   Constitutional: She is oriented to  person, place, and time. She appears well-developed and well-nourished.   HENT:   Head: Normocephalic and atraumatic.   Mouth/Throat: Oropharynx is clear and moist.   Eyes: Conjunctivae are normal.   Neck:   The neck is supple and symmetric   Cardiovascular: Normal rate, regular rhythm and normal heart sounds.   Pulmonary/Chest: Effort normal and breath sounds normal.   Musculoskeletal: She exhibits no edema.   Neurological: She is alert and oriented to person, place, and time.   Movement disorder   Skin: No rash noted.   hirsutism of the chin and cheeks, neck.    Psychiatric: She has a normal mood and affect. Thought content normal.   Vitals reviewed.      LABS AND IMAGING        Results for orders placed or performed in visit on 04/30/19   POC Glycosylated Hemoglobin (Hb A1C)   Result Value Ref Range    Hemoglobin A1C 5.0 %   POCT Glucose   Result Value Ref Range    Glucose 103 70 - 130 mg/dL        Assessment:        Problem List Items Addressed This Visit        Endocrine    PCOS (polycystic ovarian syndrome) - Primary       Musculoskeletal and Integument    Hirsutism       Other    Weight gain               Plan:         Continue with the weight loss program and Saxenda 3 mg daily.   I have given her a 2 months trial of the phentermine to take 1/2 - 1 tab daily to help to overcome the weight gain related to steroids and decreased motility.     Meds refilled.       Follow-up in 6 months

## 2019-11-01 ENCOUNTER — HOSPITAL ENCOUNTER (OUTPATIENT)
Dept: MAMMOGRAPHY | Facility: HOSPITAL | Age: 59
Discharge: HOME | End: 2019-11-01
Admitting: NURSE PRACTITIONER

## 2019-11-01 PROCEDURE — 77067 SCR MAMMO BI INCL CAD: CPT

## 2019-11-01 PROCEDURE — 77063 BREAST TOMOSYNTHESIS BI: CPT

## 2019-11-14 ENCOUNTER — HOSPITAL ENCOUNTER (OUTPATIENT)
Dept: SLEEP MEDICINE | Facility: HOSPITAL | Age: 59
Discharge: HOME | End: 2019-11-14
Admitting: NURSE PRACTITIONER

## 2019-11-14 DIAGNOSIS — G47.9 SLEEP DISTURBANCES: ICD-10-CM

## 2019-11-14 PROCEDURE — 95810 POLYSOM 6/> YRS 4/> PARAM: CPT | Performed by: INTERNAL MEDICINE

## 2019-11-14 PROCEDURE — 95810 POLYSOM 6/> YRS 4/> PARAM: CPT

## 2019-12-02 DIAGNOSIS — K21.9 GASTROESOPHAGEAL REFLUX DISEASE, ESOPHAGITIS PRESENCE NOT SPECIFIED: ICD-10-CM

## 2019-12-02 DIAGNOSIS — R13.14 PHARYNGOESOPHAGEAL DYSPHAGIA: Primary | ICD-10-CM

## 2020-02-03 ENCOUNTER — HOSPITAL ENCOUNTER (OUTPATIENT)
Dept: ULTRASOUND IMAGING | Facility: HOSPITAL | Age: 60
Discharge: HOME | End: 2020-02-03
Admitting: INTERNAL MEDICINE

## 2020-02-03 ENCOUNTER — TRANSCRIBE ORDERS (OUTPATIENT)
Dept: ULTRASOUND IMAGING | Facility: HOSPITAL | Age: 60
End: 2020-02-03

## 2020-02-03 DIAGNOSIS — R59.1 LYMPHADENOPATHY: Primary | ICD-10-CM

## 2020-02-03 PROCEDURE — 76536 US EXAM OF HEAD AND NECK: CPT

## 2020-02-21 ENCOUNTER — OFFICE VISIT (OUTPATIENT)
Dept: SURGERY | Facility: CLINIC | Age: 60
End: 2020-02-21

## 2020-02-21 VITALS
DIASTOLIC BLOOD PRESSURE: 82 MMHG | OXYGEN SATURATION: 96 % | TEMPERATURE: 97.6 F | HEIGHT: 60 IN | SYSTOLIC BLOOD PRESSURE: 128 MMHG | WEIGHT: 164 LBS | HEART RATE: 96 BPM | BODY MASS INDEX: 32.2 KG/M2

## 2020-02-21 DIAGNOSIS — R59.0 ENLARGED LYMPH NODE IN NECK: Primary | ICD-10-CM

## 2020-02-21 PROCEDURE — 99203 OFFICE O/P NEW LOW 30 MIN: CPT | Performed by: SURGERY

## 2020-02-21 RX ORDER — ASCORBIC ACID 500 MG
500 TABLET ORAL DAILY
COMMUNITY
End: 2021-03-10

## 2020-02-21 NOTE — PROGRESS NOTES
Patient: Kelly Meza    YOB: 1960    Date: 02/21/2020    Primary Care Provider: Solomon Li MD    Chief Complaint   Patient presents with   • Mass     left neck       SUBJECTIVE:    History of present illness:  The patient is in the office today for evaluation of a left neck mass.  She had an ultrasound that showed an enlarged lymph node.  It was first noticed 2 weeks ago and is getting smaller.  Patient here for follow-up, possible FNA procedure.  No night sweats or weight loss.  No fever or chills.  No history of injury or upper respiratory infection.    The following portions of the patient's history were reviewed and updated as appropriate: allergies, current medications, past family history, past medical history, past social history, past surgical history and problem list.      Review of Systems   Constitutional: Negative for chills, fever and unexpected weight change.   HENT: Negative for hearing loss, trouble swallowing and voice change.    Eyes: Negative for visual disturbance.   Respiratory: Negative for apnea, cough, chest tightness, shortness of breath and wheezing.    Cardiovascular: Negative for chest pain, palpitations and leg swelling.   Gastrointestinal: Negative for abdominal distention, abdominal pain, anal bleeding, blood in stool, constipation, diarrhea, nausea, rectal pain and vomiting.   Endocrine: Negative for cold intolerance and heat intolerance.   Genitourinary: Negative for difficulty urinating, dysuria and flank pain.   Musculoskeletal: Negative for back pain and gait problem.   Skin: Negative for color change, rash and wound.   Neurological: Negative for dizziness, syncope, speech difficulty, weakness, light-headedness, numbness and headaches.   Hematological: Negative for adenopathy. Does not bruise/bleed easily.   Psychiatric/Behavioral: Negative for confusion. The patient is not nervous/anxious.        Allergies:  Allergies   Allergen Reactions   •  Ciprofloxacin Anaphylaxis   • Latex Hives and Shortness Of Breath   • Amoxicillin Nausea Only   • Codeine Sulfate Nausea And Vomiting   • Grass    • Metal Swelling   • Mold Extract [Trichophyton] Unknown (See Comments)     unknow   • Other      Animal dander   • Paxil [Paroxetine Hcl] Other (See Comments)     Noticeable mood changes   • Pollen Extract Unknown (See Comments)     unknow       Medications:    Current Outpatient Medications:   •  carbidopa-levodopa CR (SINEMET CR)  MG per CR tablet, Take 1 tablet by mouth Every Night., Disp: 30 tablet, Rfl: 6  •  clonazePAM (KlonoPIN) 1 MG tablet, Take 1 mg by mouth At Night As Needed., Disp: , Rfl:   •  Insulin Pen Needle (BD PEN NEEDLE DEIRDRE U/F) 32G X 4 MM misc, 1 each Daily., Disp: 30 each, Rfl: 11  •  Liraglutide -Weight Management (SAXENDA) 18 MG/3ML solution pen-injector, Inject 3 mg under the skin into the appropriate area as directed Daily., Disp: 4 pen, Rfl: 6  •  metroNIDAZOLE (METROCREAM) 0.75 % cream, apply TOPICALLY to face ONCE DAILY as directed, Disp: , Rfl: 99  •  mupirocin (BACTROBAN) 2 % cream, APPLY TOPICALLY TO AFFECTED AREA(S) TWICE A DAY, Disp: , Rfl: 0  •  QUEtiapine (SEROquel) 100 MG tablet, TAKE 1 TO 2 TABLETS BY MOUTH 2 HOURS BEFORE  BEDTIME, Disp: 60 tablet, Rfl: 4  •  vitamin C (ASCORBIC ACID) 500 MG tablet, Take 500 mg by mouth Daily., Disp: , Rfl:   •  INTRAROSA 6.5 MG insert, INSERT 1 VAGINALLY ONE TIME A DAY , Disp: 30 each, Rfl: 1  •  mirtazapine (REMERON) 30 MG tablet, TAKE 1 TABLET BY MOUTH IN THE EVENING , Disp: 30 tablet, Rfl: 0  •  Multiple Vitamins-Minerals (MULTIVITAMIN ADULT PO), Take  by mouth., Disp: , Rfl:   •  phentermine (ADIPEX-P) 37.5 MG tablet, Take 1 tablet by mouth Every Morning Before Breakfast., Disp: 30 tablet, Rfl: 1  •  raNITIdine (ZANTAC) 150 MG tablet, Take 150 mg by mouth 2 (Two) Times a Day., Disp: , Rfl:   •  tiZANidine (ZANAFLEX) 4 MG tablet, Take 1 tablet by mouth 3 (Three) Times a Day., Disp: 90  "tablet, Rfl: 1    History:  Past Medical History:   Diagnosis Date   • Asthma    • Basal cell carcinoma of skin    • Bilateral ovarian cysts    • Cervical spine disease    • GERD (gastroesophageal reflux disease)    • Heart murmur    • Migraine    • Ovarian cyst    • Parkinson's disease (CMS/HCC)    • Skin cancer        Past Surgical History:   Procedure Laterality Date   • BACK SURGERY     • BREAST BIOPSY     • BREAST LUMPECTOMY Right 2009   • FINGER SURGERY     • ROTATOR CUFF REPAIR Right    • SKIN CANCER EXCISION     • SPINAL FUSION  2016    with Bone spur shaved off         Family History   Problem Relation Age of Onset   • Arthritis Mother    • Cancer Mother    • Hypertension Mother    • Thyroid disease Mother    • Cancer Father    • Liver disease Father    • Osteoporosis Maternal Grandmother    • Hypertension Maternal Grandfather    • Heart attack Maternal Grandfather    • Heart disease Maternal Grandfather    • Kidney disease Paternal Grandmother        Social History     Tobacco Use   • Smoking status: Former Smoker     Types: Cigarettes     Last attempt to quit:      Years since quittin.1   • Smokeless tobacco: Never Used   Substance Use Topics   • Alcohol use: No   • Drug use: No        OBJECTIVE:    Vital Signs:   Vitals:    20 1434   BP: 128/82   Pulse: 96   Temp: 97.6 °F (36.4 °C)   TempSrc: Temporal   SpO2: 96%   Weight: 74.4 kg (164 lb)   Height: 152.4 cm (60\")       Physical Exam:   General Appearance:    Alert, cooperative, in no acute distress   Head:    Normocephalic, without obvious abnormality, atraumatic   Eyes:            Lids and lashes normal, conjunctivae and sclerae normal, no   icterus, no pallor, corneas clear, PERRLA   Ears:    Ears appear intact with no abnormalities noted   Throat:   No oral lesions, no thrush, oral mucosa moist   Neck:  Left supraclavicular adenopathy, supple, trachea midline, no thyromegaly, no   carotid bruit, no JVD   Lungs:     Clear to " auscultation,respirations regular, even and                  unlabored    Heart:    Regular rhythm and normal rate, normal S1 and S2, no            murmur, no gallop, no rub, no click   Chest Wall:    No abnormalities observed   Abdomen:     Normal bowel sounds, no masses, no organomegaly, soft        non-tender, non-distended, no guarding, no rebound                tenderness   Extremities:   Moves all extremities well, no edema, no cyanosis, no             redness   Pulses:   Pulses palpable and equal bilaterally   Skin:   No bleeding, bruising or rash   Lymph nodes:   No palpable adenopathy   Neurologic:   Cranial nerves 2 - 12 grossly intact, sensation intact, DTR       present and equal bilaterally     Results Review:   I reviewed the patient's new clinical results.    Review of Systems was reviewed and confirmed as accurate as documented by the MA.    ASSESSMENT/PLAN:    1. Enlarged lymph node in neck        Repeat ultrasound today, possible FNA if lymph node has not decreased in size.  Fortunately, ultrasound indicates that lymph node has decreased in size significantly.  Recommend repeat ultrasound in 3 months.  Reassured, no further concerns.    I discussed the patients findings and my recommendations with patient        Electronically signed by Emili Lind MD  02/21/20      Portions of this note have been scribed for Emili Lind MD by Michelle Milligan. 2/21/2020  2:51 PM

## 2020-03-04 ENCOUNTER — OFFICE VISIT (OUTPATIENT)
Dept: ENDOCRINOLOGY | Facility: CLINIC | Age: 60
End: 2020-03-04

## 2020-03-04 VITALS
HEIGHT: 60 IN | WEIGHT: 164 LBS | BODY MASS INDEX: 32.2 KG/M2 | RESPIRATION RATE: 16 BRPM | OXYGEN SATURATION: 98 % | DIASTOLIC BLOOD PRESSURE: 64 MMHG | SYSTOLIC BLOOD PRESSURE: 108 MMHG | HEART RATE: 89 BPM

## 2020-03-04 DIAGNOSIS — R63.5 WEIGHT GAIN: ICD-10-CM

## 2020-03-04 DIAGNOSIS — E28.2 PCOS (POLYCYSTIC OVARIAN SYNDROME): ICD-10-CM

## 2020-03-04 DIAGNOSIS — E66.09 CLASS 1 OBESITY DUE TO EXCESS CALORIES WITHOUT SERIOUS COMORBIDITY WITH BODY MASS INDEX (BMI) OF 32.0 TO 32.9 IN ADULT: ICD-10-CM

## 2020-03-04 DIAGNOSIS — R59.0 CERVICAL LYMPHADENOPATHY: ICD-10-CM

## 2020-03-04 DIAGNOSIS — E88.81 INSULIN RESISTANCE: Primary | ICD-10-CM

## 2020-03-04 LAB
ALBUMIN SERPL-MCNC: 4.3 G/DL (ref 3.5–5.2)
ALBUMIN/GLOB SERPL: 2.3 G/DL
ALP SERPL-CCNC: 90 U/L (ref 39–117)
ALT SERPL W P-5'-P-CCNC: 7 U/L (ref 1–33)
ANION GAP SERPL CALCULATED.3IONS-SCNC: 14 MMOL/L (ref 5–15)
AST SERPL-CCNC: 19 U/L (ref 1–32)
BASOPHILS # BLD AUTO: 0.06 10*3/MM3 (ref 0–0.2)
BASOPHILS NFR BLD AUTO: 1.4 % (ref 0–1.5)
BILIRUB SERPL-MCNC: 0.4 MG/DL (ref 0.2–1.2)
BUN BLD-MCNC: 15 MG/DL (ref 6–20)
BUN/CREAT SERPL: 19.5 (ref 7–25)
CALCIUM SPEC-SCNC: 9.2 MG/DL (ref 8.6–10.5)
CHLORIDE SERPL-SCNC: 103 MMOL/L (ref 98–107)
CO2 SERPL-SCNC: 25 MMOL/L (ref 22–29)
CREAT BLD-MCNC: 0.77 MG/DL (ref 0.57–1)
DEPRECATED RDW RBC AUTO: 39.8 FL (ref 37–54)
EOSINOPHIL # BLD AUTO: 0.24 10*3/MM3 (ref 0–0.4)
EOSINOPHIL NFR BLD AUTO: 5.7 % (ref 0.3–6.2)
ERYTHROCYTE [DISTWIDTH] IN BLOOD BY AUTOMATED COUNT: 11.9 % (ref 12.3–15.4)
ERYTHROCYTE [SEDIMENTATION RATE] IN BLOOD: 7 MM/HR (ref 0–30)
EXPIRATION DATE: NORMAL
EXPIRATION DATE: NORMAL
GFR SERPL CREATININE-BSD FRML MDRD: 77 ML/MIN/1.73
GLOBULIN UR ELPH-MCNC: 1.9 GM/DL
GLUCOSE BLD-MCNC: 89 MG/DL (ref 65–99)
GLUCOSE BLDC GLUCOMTR-MCNC: 93 MG/DL (ref 70–130)
HBA1C MFR BLD: 5.5 %
HCT VFR BLD AUTO: 39.6 % (ref 34–46.6)
HGB BLD-MCNC: 13.7 G/DL (ref 12–15.9)
LDH SERPL-CCNC: 231 U/L (ref 135–214)
LYMPHOCYTES # BLD AUTO: 1.27 10*3/MM3 (ref 0.7–3.1)
LYMPHOCYTES NFR BLD AUTO: 30.3 % (ref 19.6–45.3)
Lab: NORMAL
Lab: NORMAL
MCH RBC QN AUTO: 31.6 PG (ref 26.6–33)
MCHC RBC AUTO-ENTMCNC: 34.6 G/DL (ref 31.5–35.7)
MCV RBC AUTO: 91.2 FL (ref 79–97)
MONOCYTES # BLD AUTO: 0.43 10*3/MM3 (ref 0.1–0.9)
MONOCYTES NFR BLD AUTO: 10.3 % (ref 5–12)
NEUTROPHILS # BLD AUTO: 2.18 10*3/MM3 (ref 1.7–7)
NEUTROPHILS NFR BLD AUTO: 52.1 % (ref 42.7–76)
PLATELET # BLD AUTO: 164 10*3/MM3 (ref 140–450)
PMV BLD AUTO: 13.3 FL (ref 6–12)
POTASSIUM BLD-SCNC: 3.9 MMOL/L (ref 3.5–5.2)
PROT SERPL-MCNC: 6.2 G/DL (ref 6–8.5)
RBC # BLD AUTO: 4.34 10*6/MM3 (ref 3.77–5.28)
SODIUM BLD-SCNC: 142 MMOL/L (ref 136–145)
T4 FREE SERPL-MCNC: 1.1 NG/DL (ref 0.93–1.7)
TSH SERPL DL<=0.05 MIU/L-ACNC: 2.42 UIU/ML (ref 0.27–4.2)
WBC NRBC COR # BLD: 4.19 10*3/MM3 (ref 3.4–10.8)

## 2020-03-04 PROCEDURE — 85025 COMPLETE CBC W/AUTO DIFF WBC: CPT | Performed by: INTERNAL MEDICINE

## 2020-03-04 PROCEDURE — 82947 ASSAY GLUCOSE BLOOD QUANT: CPT | Performed by: INTERNAL MEDICINE

## 2020-03-04 PROCEDURE — 84439 ASSAY OF FREE THYROXINE: CPT | Performed by: INTERNAL MEDICINE

## 2020-03-04 PROCEDURE — 85652 RBC SED RATE AUTOMATED: CPT | Performed by: INTERNAL MEDICINE

## 2020-03-04 PROCEDURE — 83615 LACTATE (LD) (LDH) ENZYME: CPT | Performed by: INTERNAL MEDICINE

## 2020-03-04 PROCEDURE — 83036 HEMOGLOBIN GLYCOSYLATED A1C: CPT | Performed by: INTERNAL MEDICINE

## 2020-03-04 PROCEDURE — 80053 COMPREHEN METABOLIC PANEL: CPT | Performed by: INTERNAL MEDICINE

## 2020-03-04 PROCEDURE — 84443 ASSAY THYROID STIM HORMONE: CPT | Performed by: INTERNAL MEDICINE

## 2020-03-04 PROCEDURE — 99213 OFFICE O/P EST LOW 20 MIN: CPT | Performed by: INTERNAL MEDICINE

## 2020-03-04 RX ORDER — PHENTERMINE HYDROCHLORIDE 37.5 MG/1
37.5 TABLET ORAL
Qty: 30 TABLET | Refills: 3 | Status: SHIPPED | OUTPATIENT
Start: 2020-03-04 | End: 2020-12-08

## 2020-03-04 RX ORDER — CLOCORTOLONE PIVALATE 0 G/G
CREAM TOPICAL
COMMUNITY

## 2020-03-04 NOTE — PROGRESS NOTES
Subjective:   Polycystic Ovary Syndrome and Insulin Resistance        Kelly Meza is a 60 y.o. female who is being seen for follow-up for hyperglycemia, abnormal thyroid function tests and obesity.  Patient reported difficulty with weight loss despite diet and exercises. She has tried different weight loss programs, contrave with minimal effect. She had some features of insulin resistance  5/2018 we have started Saxenda and she tolerated medication well. She was able to increased the dose slowly to current 3 mg. Lost >50 lbs. In summer she received steroid course IV followed by PO for 10 days. She feels that since then the weight has increased despite her best efforts and Saxenda. I have given her phentermine trial last time but it wasn't effective and she stopped it.     She has gained 20 lbs after steroid treatments.   She reported having enlarged l/node in the left supraclavicular area. The nodule is decreased in size and excision is recommended/      Review of Systems  Review of Systems   Constitutional: Positive for fatigue and unexpected weight change (weight gain ). Negative for activity change, chills and diaphoresis.   HENT: Negative for congestion, ear pain, facial swelling, hearing loss, postnasal drip and trouble swallowing.    Eyes: Negative for visual disturbance.   Respiratory: Negative for cough.    Cardiovascular: Negative for chest pain, palpitations and leg swelling.   Gastrointestinal: Positive for constipation and diarrhea. Negative for abdominal distention, abdominal pain, nausea and vomiting.   Endocrine:        As listed in HPI   Genitourinary: Negative.  Negative for menstrual problem (she had menses up until 50 yo when started OCP. ).   Musculoskeletal: Positive for back pain and neck pain. Negative for arthralgias, joint swelling, myalgias and neck stiffness.   Skin: Negative.         Facial hair   Allergic/Immunologic: Positive for environmental allergies.   Neurological: Positive  for weakness, numbness (tingling in the right arm>left. ) and headaches. Negative for dizziness, tremors, syncope and light-headedness.   Hematological: Positive for adenopathy.   Psychiatric/Behavioral: Positive for sleep disturbance. The patient is nervous/anxious.    All other systems reviewed and are negative.     Current medications:  Current Outpatient Medications   Medication Sig Dispense Refill   • carbidopa-levodopa CR (SINEMET CR)  MG per CR tablet Take 1 tablet by mouth Every Night. 30 tablet 6   • clonazePAM (KlonoPIN) 1 MG tablet Take 1 mg by mouth At Night As Needed.     • Insulin Pen Needle (BD PEN NEEDLE DEIRDRE U/F) 32G X 4 MM misc 1 each Daily. 30 each 11   • INTRAROSA 6.5 MG insert INSERT 1 VAGINALLY ONE TIME A DAY  30 each 1   • Liraglutide -Weight Management (SAXENDA) 18 MG/3ML solution pen-injector Inject 3 mg under the skin into the appropriate area as directed Daily. 4 pen 6   • metroNIDAZOLE (METROCREAM) 0.75 % cream apply TOPICALLY to face ONCE DAILY as directed  99   • mirtazapine (REMERON) 30 MG tablet TAKE 1 TABLET BY MOUTH IN THE EVENING  30 tablet 0   • Multiple Vitamins-Minerals (MULTIVITAMIN ADULT PO) Take  by mouth.     • mupirocin (BACTROBAN) 2 % cream APPLY TOPICALLY TO AFFECTED AREA(S) TWICE A DAY  0   • QUEtiapine (SEROquel) 100 MG tablet TAKE 1 TO 2 TABLETS BY MOUTH 2 HOURS BEFORE  BEDTIME 60 tablet 4   • raNITIdine (ZANTAC) 150 MG tablet Take 150 mg by mouth 2 (Two) Times a Day.     • tiZANidine (ZANAFLEX) 4 MG tablet Take 1 tablet by mouth 3 (Three) Times a Day. 90 tablet 1   • vitamin C (ASCORBIC ACID) 500 MG tablet Take 500 mg by mouth Daily.       No current facility-administered medications for this visit.        PMH  The following portions of the patient's history were reviewed and updated as appropriate: allergies, current medications, past family history, past medical history, past social history, past surgical history and problem list.      Objective:     Vitals:     03/04/20 1152   BP: 108/64   Pulse: 89   Resp: 16   SpO2: 98%   Body mass index is 32.03 kg/m².  Physical Exam   Constitutional: She is oriented to person, place, and time. She appears well-developed and well-nourished.   HENT:   Head: Normocephalic and atraumatic.   Eyes: Conjunctivae are normal.   Neck:   The neck is supple and symmetric   Cardiovascular: Normal rate, regular rhythm and normal heart sounds.   Pulmonary/Chest: Effort normal and breath sounds normal.   Musculoskeletal: She exhibits no edema.   Lymphadenopathy:     She has cervical adenopathy (1 cm left supraclavicular l/node. tender on palpation, easy movable. ).   Neurological: She is alert and oriented to person, place, and time.   Movement disorder   Skin: Rash noted.   hirsutism of the chin and cheeks, neck.    Psychiatric: She has a normal mood and affect. Thought content normal.   Vitals reviewed.      LABS AND IMAGING        Results for orders placed or performed in visit on 03/04/20   POC Glycosylated Hemoglobin (Hb A1C)   Result Value Ref Range    Hemoglobin A1C 5.5 %    Lot Number [id number]     Expiration Date 2021-12-10    POC Glucose Fingerstick   Result Value Ref Range    Glucose 93 70 - 130 mg/dL    Lot Number [id number]     Expiration Date 08/22/20         Assessment:        Problem List Items Addressed This Visit        Digestive    Class 1 obesity due to excess calories without serious comorbidity with body mass index (BMI) of 32.0 to 32.9 in adult    Relevant Medications    Liraglutide -Weight Management (SAXENDA) 18 MG/3ML solution pen-injector    Other Relevant Orders    TSH    T4, Free       Endocrine    PCOS (polycystic ovarian syndrome)    Insulin resistance - Primary    Relevant Medications    Liraglutide -Weight Management (SAXENDA) 18 MG/3ML solution pen-injector    Other Relevant Orders    POC Glycosylated Hemoglobin (Hb A1C) (Completed)    POC Glucose Fingerstick (Completed)    Comprehensive Metabolic Panel      Other  Visit Diagnoses     Cervical lymphadenopathy        Relevant Orders    Sedimentation Rate    CBC Auto Differential    Lactate Dehydrogenase    Weight gain        Relevant Medications    Liraglutide -Weight Management (SAXENDA) 18 MG/3ML solution pen-injector               Plan:         Continue with the weight loss program and Saxenda 3 mg daily.   I have given her a 2 months trial of the phentermine to take 1/2 - 1 tab daily and it wasn't effective.   -I have given 3-4 months trial of phentermine to help with weight loss.   Meds refilled. Revisited lifestyle modifications.   Cervical lymphadenopathy - follow up with ENT for biopsy. I have ordered inflammatory labs       Follow-up in 6 months

## 2020-04-10 RX ORDER — CARBIDOPA AND LEVODOPA 50; 200 MG/1; MG/1
1 TABLET, EXTENDED RELEASE ORAL NIGHTLY
Qty: 30 TABLET | Refills: 0 | OUTPATIENT
Start: 2020-04-10

## 2020-09-29 ENCOUNTER — TELEPHONE (OUTPATIENT)
Dept: ENDOCRINOLOGY | Facility: CLINIC | Age: 60
End: 2020-09-29

## 2020-09-29 DIAGNOSIS — E88.81 INSULIN RESISTANCE: ICD-10-CM

## 2020-09-29 DIAGNOSIS — R63.5 WEIGHT GAIN: ICD-10-CM

## 2020-09-29 RX ORDER — LIRAGLUTIDE 6 MG/ML
3 INJECTION, SOLUTION SUBCUTANEOUS DAILY
Qty: 4 PEN | Refills: 6 | Status: SHIPPED | OUTPATIENT
Start: 2020-09-29 | End: 2020-12-08 | Stop reason: SDUPTHER

## 2020-09-29 NOTE — TELEPHONE ENCOUNTER
Pa submitted for [facility]  Approved, RX resent to Pharmacy  Effective until 9/25/2021

## 2020-10-13 ENCOUNTER — HOSPITAL ENCOUNTER (EMERGENCY)
Facility: HOSPITAL | Age: 60
Discharge: HOME | End: 2020-10-13
Attending: EMERGENCY MEDICINE | Admitting: EMERGENCY MEDICINE

## 2020-10-13 ENCOUNTER — APPOINTMENT (OUTPATIENT)
Dept: CT IMAGING | Facility: HOSPITAL | Age: 60
End: 2020-10-13

## 2020-10-13 ENCOUNTER — APPOINTMENT (OUTPATIENT)
Dept: GENERAL RADIOLOGY | Facility: HOSPITAL | Age: 60
End: 2020-10-13

## 2020-10-13 VITALS
BODY MASS INDEX: 29.71 KG/M2 | TEMPERATURE: 98.3 F | DIASTOLIC BLOOD PRESSURE: 90 MMHG | HEART RATE: 108 BPM | WEIGHT: 174 LBS | OXYGEN SATURATION: 95 % | RESPIRATION RATE: 18 BRPM | HEIGHT: 64 IN | SYSTOLIC BLOOD PRESSURE: 127 MMHG

## 2020-10-13 DIAGNOSIS — S63.501A SPRAIN OF RIGHT WRIST, INITIAL ENCOUNTER: ICD-10-CM

## 2020-10-13 DIAGNOSIS — S00.81XA ABRASION OF FACE, INITIAL ENCOUNTER: ICD-10-CM

## 2020-10-13 DIAGNOSIS — S63.91XA SPRAIN OF RIGHT HAND, INITIAL ENCOUNTER: ICD-10-CM
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
## 2020-10-13 PROCEDURE — 73110 X-RAY EXAM OF WRIST: CPT

## 2020-10-13 PROCEDURE — 70450 CT HEAD/BRAIN W/O DYE: CPT

## 2020-10-13 PROCEDURE — 99282 EMERGENCY DEPT VISIT SF MDM: CPT

## 2020-10-13 PROCEDURE — 73130 X-RAY EXAM OF HAND: CPT

## 2020-10-13 PROCEDURE — 70486 CT MAXILLOFACIAL W/O DYE: CPT

## 2020-10-13 PROCEDURE — 73030 X-RAY EXAM OF SHOULDER: CPT

## 2020-10-13 NOTE — ED PROVIDER NOTES
Subjective   60-year-old female presents after fall.  She has a history of Parkinson's disease, and has an unsteady gait.  She tripped and fell, she at the right side of her head, and face.  She had on her right shoulder her wrist and hand.  She got scraped on her hand and wrist, and on the side of her face.  No loss of consciousness.      History provided by:  Patient   used: No        Review of Systems   HENT:        Injury to head and face   Musculoskeletal:        Injury to right shoulder right wrist right hand.   Skin:        Skin abrasions on face and right wrist and hand   All other systems reviewed and are negative.      Past Medical History:   Diagnosis Date   • Asthma    • Basal cell carcinoma of skin    • Bilateral ovarian cysts    • Cervical spine disease    • GERD (gastroesophageal reflux disease)    • Heart murmur    • Migraine    • Ovarian cyst    • Parkinson's disease (CMS/HCC)    • Skin cancer        Allergies   Allergen Reactions   • Ciprofloxacin Anaphylaxis   • Latex Hives and Shortness Of Breath   • Amoxicillin Nausea Only   • Codeine Sulfate Nausea And Vomiting   • Grass    • Metal Swelling   • Mold Extract [Trichophyton] Unknown (See Comments)     unknow   • Other      Animal dander   • Paxil [Paroxetine Hcl] Other (See Comments)     Noticeable mood changes   • Pollen Extract Unknown (See Comments)     unknow       Past Surgical History:   Procedure Laterality Date   • BACK SURGERY     • BREAST BIOPSY     • BREAST LUMPECTOMY Right 2009   • FINGER SURGERY     • ROTATOR CUFF REPAIR Right    • SKIN CANCER EXCISION     • SPINAL FUSION  12/07/2016    with Bone spur shaved off         Family History   Problem Relation Age of Onset   • Arthritis Mother    • Cancer Mother    • Hypertension Mother    • Thyroid disease Mother    • Cancer Father    • Liver disease Father    • Osteoporosis Maternal Grandmother    • Hypertension Maternal Grandfather    • Heart attack Maternal  Grandfather    • Heart disease Maternal Grandfather    • Kidney disease Paternal Grandmother        Social History     Socioeconomic History   • Marital status:      Spouse name: Not on file   • Number of children: Not on file   • Years of education: Not on file   • Highest education level: Not on file   Tobacco Use   • Smoking status: Former Smoker     Types: Cigarettes     Quit date:      Years since quittin.8   • Smokeless tobacco: Never Used   Substance and Sexual Activity   • Alcohol use: No   • Drug use: No   • Sexual activity: Defer           Objective   Physical Exam  Vitals signs and nursing note reviewed.   Constitutional:       Appearance: She is well-developed.   HENT:      Head:      Comments: Contusion to the right side of the face  Neck:      Musculoskeletal: Normal range of motion and neck supple.   Cardiovascular:      Rate and Rhythm: Normal rate and regular rhythm.   Pulmonary:      Effort: Pulmonary effort is normal.      Breath sounds: Normal breath sounds.   Musculoskeletal:         General: Tenderness present.      Comments: Tenderness to palpation of the right shoulder, right wrist, and right hand.   Skin:     Comments: Skin abrasion to the right side of the face lateral to the right eye, skin abrasions to the right wrist and multiple fingers on the right hand   Neurological:      Mental Status: She is alert and oriented to person, place, and time.      Deep Tendon Reflexes: Reflexes are normal and symmetric.         Procedures           ED Course                                           MDM  Number of Diagnoses or Management Options  Abrasion of face, initial encounter: new and requires workup  Fall, initial encounter: new and requires workup  Sprain of right hand, initial encounter: new and requires workup  Sprain of right wrist, initial encounter: new and requires workup     Amount and/or Complexity of Data Reviewed  Tests in the radiology section of CPT®: reviewed    Risk  of Complications, Morbidity, and/or Mortality  Presenting problems: minimal  Diagnostic procedures: minimal  Management options: minimal    Patient Progress  Patient progress: stable      Final diagnoses:   Fall, initial encounter   Sprain of right wrist, initial encounter   Sprain of right hand, initial encounter   Abrasion of face, initial encounter            Varinder Alex Jr., PA-C  10/13/20 [id number]

## 2020-11-20 ENCOUNTER — TRANSCRIBE ORDERS (OUTPATIENT)
Dept: ADMINISTRATIVE | Facility: HOSPITAL | Age: 60
End: 2020-11-20

## 2020-11-20 DIAGNOSIS — Z12.31 ENCOUNTER FOR SCREENING MAMMOGRAM FOR BREAST CANCER: Primary | ICD-10-CM

## 2020-11-26 ENCOUNTER — HOSPITAL ENCOUNTER (EMERGENCY)
Facility: HOSPITAL | Age: 60
Discharge: HOME | End: 2020-11-26
Attending: STUDENT IN AN ORGANIZED HEALTH CARE EDUCATION/TRAINING PROGRAM | Admitting: STUDENT IN AN ORGANIZED HEALTH CARE EDUCATION/TRAINING PROGRAM

## 2020-11-26 VITALS
SYSTOLIC BLOOD PRESSURE: 121 MMHG | RESPIRATION RATE: 18 BRPM | BODY MASS INDEX: 30.32 KG/M2 | TEMPERATURE: 97.8 F | HEIGHT: 64 IN | OXYGEN SATURATION: 98 % | HEART RATE: 89 BPM | DIASTOLIC BLOOD PRESSURE: 88 MMHG | WEIGHT: 177.6 LBS

## 2020-11-26 DIAGNOSIS — L02.91 ABSCESS: ICD-10-CM

## 2020-11-26 DIAGNOSIS — T63.301A SPIDER BITE WOUND, ACCIDENTAL OR UNINTENTIONAL, INITIAL ENCOUNTER: Primary | ICD-10-CM

## 2020-11-26 PROCEDURE — 99282 EMERGENCY DEPT VISIT SF MDM: CPT

## 2020-11-26 RX ORDER — LEVODOPA AND CARBIDOPA 95; 23.75 MG/1; MG/1
1 CAPSULE, EXTENDED RELEASE ORAL 3 TIMES DAILY
COMMUNITY

## 2020-11-26 RX ORDER — INDOMETHACIN 25 MG/1
25 CAPSULE ORAL 2 TIMES DAILY WITH MEALS
Qty: 15 CAPSULE | Refills: 0 | Status: SHIPPED | OUTPATIENT
Start: 2020-11-26 | End: 2021-03-10

## 2020-11-26 RX ORDER — DOXYCYCLINE 100 MG/1
100 CAPSULE ORAL 2 TIMES DAILY
Qty: 20 CAPSULE | Refills: 0 | Status: SHIPPED | OUTPATIENT
Start: 2020-11-26 | End: 2020-12-08

## 2020-12-08 ENCOUNTER — OFFICE VISIT (OUTPATIENT)
Dept: ENDOCRINOLOGY | Facility: CLINIC | Age: 60
End: 2020-12-08

## 2020-12-08 VITALS — TEMPERATURE: 97.3 F | WEIGHT: 177 LBS | BODY MASS INDEX: 30.22 KG/M2 | HEIGHT: 64 IN

## 2020-12-08 DIAGNOSIS — E88.81 INSULIN RESISTANCE: ICD-10-CM

## 2020-12-08 DIAGNOSIS — N95.9 POSTMENOPAUSAL SYMPTOMS: ICD-10-CM

## 2020-12-08 DIAGNOSIS — R63.5 WEIGHT GAIN: ICD-10-CM

## 2020-12-08 DIAGNOSIS — E66.09 CLASS 1 OBESITY DUE TO EXCESS CALORIES WITHOUT SERIOUS COMORBIDITY WITH BODY MASS INDEX (BMI) OF 32.0 TO 32.9 IN ADULT: Primary | ICD-10-CM

## 2020-12-08 PROCEDURE — 99443 PR PHYS/QHP TELEPHONE EVALUATION 21-30 MIN: CPT | Performed by: INTERNAL MEDICINE

## 2020-12-08 RX ORDER — ESTRADIOL/NORETHINDRONE ACETATE TRANSDERMAL SYSTEM .05; .14 MG/D; MG/D
1 PATCH, EXTENDED RELEASE TRANSDERMAL 2 TIMES WEEKLY
Qty: 8 PATCH | Refills: 6 | Status: SHIPPED | OUTPATIENT
Start: 2020-12-10 | End: 2020-12-16

## 2020-12-08 RX ORDER — AMANTADINE HYDROCHLORIDE 100 MG/1
100 TABLET ORAL 2 TIMES DAILY
COMMUNITY
Start: 2020-12-02 | End: 2021-12-07

## 2020-12-08 RX ORDER — LIRAGLUTIDE 6 MG/ML
3 INJECTION, SOLUTION SUBCUTANEOUS DAILY
Qty: 4 PEN | Refills: 6 | Status: SHIPPED | OUTPATIENT
Start: 2020-12-08 | End: 2021-12-07

## 2020-12-08 RX ORDER — MELATONIN
1000 DAILY
COMMUNITY

## 2020-12-08 NOTE — PROGRESS NOTES
You have chosen to receive care through a telephone visit. Do you consent to use a telephone visit for your medical care today? Yes  Subjective:   Polycystic Ovary Syndrome (Follow Up) and Insulin Resistance        Kelly Meza is a 60 y.o. female who is being seen for follow-up for hyperglycemia, abnormal thyroid function tests and obesity.  Patient reported difficulty with weight loss despite diet and exercises. She has tried different weight loss programs, contrave with minimal effect. She had some features of insulin resistance  5/2018 we have started Saxenda and she tolerated medication well.  Lost >50 lbs.    She has stopped the saxenda and regained weight.   She has started going to alternative clinic and was told that she has abnormal low estrogen and abnormal adrenal function. Started adrenal support supplements and female supplements with no improvement in symptoms. She reported hot flashes and night sweats for some time.          Review of Systems  Review of Systems   Constitutional: Positive for fatigue and unexpected weight change (weight gain ). Negative for activity change, chills. Positive for hot flashes and night sweats.    Musculoskeletal: Positive for back pain and neck pain. Psychiatric/Behavioral: Positive for sleep disturbance. The patient is nervous/anxious.    All other systems reviewed and are negative.     Current medications:  Current Outpatient Medications   Medication Sig Dispense Refill   • ADRENAL CORTEX PO Take  by mouth.     • amantadine (SYMMETREL) 100 MG tablet Take 100 mg by mouth 2 (Two) Times a Day.     • Carbidopa-Levodopa ER (Rytary) 23.75-95 MG capsule controlled-release Take 1 capsule by mouth 3 (Three) Times a Day.     • cholecalciferol (VITAMIN D3) 25 MCG (1000 UT) tablet Take 1,000 Units by mouth Daily.     • Clocortolone Pivalate Pump 0.1 % cream Apply  topically.     • clonazePAM (KlonoPIN) 1 MG tablet Take 1 mg by mouth At Night As Needed.     • indomethacin  (INDOCIN) 25 MG capsule Take 1 capsule by mouth 2 (Two) Times a Day With Meals. 15 capsule 0   • Insulin Pen Needle (BD PEN NEEDLE DEIRDRE U/F) 32G X 4 MM misc 1 each Daily. 30 each 11   • metroNIDAZOLE (METROCREAM) 0.75 % cream apply TOPICALLY to face ONCE DAILY as directed  99   • Multiple Vitamins-Minerals (MULTIVITAMIN ADULT PO) Take  by mouth.     • mupirocin (BACTROBAN) 2 % cream APPLY TOPICALLY TO AFFECTED AREA(S) TWICE A DAY  0   • QUEtiapine (SEROquel) 100 MG tablet TAKE 1 TO 2 TABLETS BY MOUTH 2 HOURS BEFORE  BEDTIME 60 tablet 4   • vitamin C (ASCORBIC ACID) 500 MG tablet Take 500 mg by mouth Daily.     • [START ON 12/10/2020] estradiol-norethindrone (CombiPatch) 0.05-0.14 MG/DAY patch Place 1 patch on the skin as directed by provider 2 (Two) Times a Week. 8 patch 6   • Liraglutide -Weight Management (Saxenda) 18 MG/3ML solution pen-injector Inject 3 mg under the skin into the appropriate area as directed Daily. 4 pen 6     No current facility-administered medications for this visit.        PMH  The following portions of the patient's history were reviewed and updated as appropriate: allergies, current medications, past family history, past medical history, past social history, past surgical history and problem list.      Objective:     Vitals:    12/08/20 1155   Temp: 97.3 °F (36.3 °C)   Body mass index is 30.38 kg/m².  Physical Exam   Constitutional: She is oriented to person, place, and time. Psychiatric: She has a normal mood and affect. Thought content normal.   Vitals reviewed.      LABS AND IMAGING        Results for orders placed or performed in visit on 03/04/20   Sedimentation Rate    Specimen: Blood   Result Value Ref Range    Sed Rate 7 0 - 30 mm/hr   CBC Auto Differential    Specimen: Blood   Result Value Ref Range    WBC 4.19 3.40 - 10.80 10*3/mm3    RBC 4.34 3.77 - 5.28 10*6/mm3    Hemoglobin 13.7 12.0 - 15.9 g/dL    Hematocrit 39.6 34.0 - 46.6 %    MCV 91.2 79.0 - 97.0 fL    MCH 31.6 26.6 -  33.0 pg    MCHC 34.6 31.5 - 35.7 g/dL    RDW 11.9 (L) 12.3 - 15.4 %    RDW-SD 39.8 37.0 - 54.0 fl    MPV 13.3 (H) 6.0 - 12.0 fL    Platelets 164 140 - 450 10*3/mm3    Neutrophil % 52.1 42.7 - 76.0 %    Lymphocyte % 30.3 19.6 - 45.3 %    Monocyte % 10.3 5.0 - 12.0 %    Eosinophil % 5.7 0.3 - 6.2 %    Basophil % 1.4 0.0 - 1.5 %    Neutrophils, Absolute 2.18 1.70 - 7.00 10*3/mm3    Lymphocytes, Absolute 1.27 0.70 - 3.10 10*3/mm3    Monocytes, Absolute 0.43 0.10 - 0.90 10*3/mm3    Eosinophils, Absolute 0.24 0.00 - 0.40 10*3/mm3    Basophils, Absolute 0.06 0.00 - 0.20 10*3/mm3   Comprehensive Metabolic Panel    Specimen: Blood   Result Value Ref Range    Glucose 89 65 - 99 mg/dL    BUN 15 6 - 20 mg/dL    Creatinine 0.77 0.57 - 1.00 mg/dL    Sodium 142 136 - 145 mmol/L    Potassium 3.9 3.5 - 5.2 mmol/L    Chloride 103 98 - 107 mmol/L    CO2 25.0 22.0 - 29.0 mmol/L    Calcium 9.2 8.6 - 10.5 mg/dL    Total Protein 6.2 6.0 - 8.5 g/dL    Albumin 4.30 3.50 - 5.20 g/dL    ALT (SGPT) 7 1 - 33 U/L    AST (SGOT) 19 1 - 32 U/L    Alkaline Phosphatase 90 39 - 117 U/L    Total Bilirubin 0.4 0.2 - 1.2 mg/dL    eGFR Non African Amer 77 >60 mL/min/1.73    Globulin 1.9 gm/dL    A/G Ratio 2.3 g/dL    BUN/Creatinine Ratio 19.5 7.0 - 25.0    Anion Gap 14.0 5.0 - 15.0 mmol/L   TSH    Specimen: Blood   Result Value Ref Range    TSH 2.420 0.270 - 4.200 uIU/mL   T4, Free    Specimen: Blood   Result Value Ref Range    Free T4 1.10 0.93 - 1.70 ng/dL   Lactate Dehydrogenase    Specimen: Blood   Result Value Ref Range     (H) 135 - 214 U/L   POC Glycosylated Hemoglobin (Hb A1C)    Specimen: Blood   Result Value Ref Range    Hemoglobin A1C 5.5 %    Lot Number [id number]     Expiration Date 2021-12-10    POC Glucose Fingerstick    Specimen: Blood   Result Value Ref Range    Glucose 93 70 - 130 mg/dL    Lot Number [id number]     Expiration Date 08/22/20         Assessment:        Problem List Items Addressed This Visit        Digestive    Class  1 obesity due to excess calories without serious comorbidity with body mass index (BMI) of 32.0 to 32.9 in adult - Primary    Relevant Medications    Liraglutide -Weight Management (Saxenda) 18 MG/3ML solution pen-injector       Endocrine    Insulin resistance    Relevant Medications    Liraglutide -Weight Management (Saxenda) 18 MG/3ML solution pen-injector       Genitourinary    Postmenopausal symptoms      Other Visit Diagnoses     Weight gain        Relevant Medications    Liraglutide -Weight Management (Saxenda) 18 MG/3ML solution pen-injector               Plan:         Continue with the weight loss program and restart  Saxenda   Meds refilled. Revisited lifestyle modifications.   I have reviewed with her at length the alternative medications and supplements that she is taking. Reviewed adrenal disorders. I have advised against the adrenal support supplements. We will retest adrenal function 6-8 weeks after she is off the adrenal supplements,   In terms of HRT - recommended a trial of conventional replacement with combipatch instead of the alternative medicine bovine female hormone supplement. Risks and benefits of HRT reviewed.     Follow-up in 2-3 months    29 min spent on medical discussion

## 2020-12-16 RX ORDER — ESTRADIOL AND LEVONORGESTREL .045; .015 MG/D; MG/D
1 PATCH TRANSDERMAL WEEKLY
Qty: 4 PATCH | Refills: 11 | Status: SHIPPED | OUTPATIENT
Start: 2020-12-16 | End: 2021-03-10 | Stop reason: CLARIF

## 2020-12-24 RX ORDER — PEN NEEDLE, DIABETIC 32GX 5/32"
NEEDLE, DISPOSABLE MISCELLANEOUS
Qty: 100 EACH | Refills: 0 | Status: SHIPPED | OUTPATIENT
Start: 2020-12-24

## 2021-01-04 ENCOUNTER — LAB (OUTPATIENT)
Dept: LAB | Facility: HOSPITAL | Age: 61
End: 2021-01-04

## 2021-01-04 DIAGNOSIS — E66.09 CLASS 1 OBESITY DUE TO EXCESS CALORIES WITHOUT SERIOUS COMORBIDITY WITH BODY MASS INDEX (BMI) OF 32.0 TO 32.9 IN ADULT: ICD-10-CM

## 2021-01-04 DIAGNOSIS — E88.81 INSULIN RESISTANCE: ICD-10-CM

## 2021-01-04 DIAGNOSIS — N95.9 POSTMENOPAUSAL SYMPTOMS: ICD-10-CM

## 2021-01-04 DIAGNOSIS — R63.5 WEIGHT GAIN: ICD-10-CM

## 2021-01-04 LAB
ALBUMIN SERPL-MCNC: 4.3 G/DL (ref 3.5–5.2)
ALBUMIN/GLOB SERPL: 1.7 G/DL
ALP SERPL-CCNC: 112 U/L (ref 39–117)
ALT SERPL W P-5'-P-CCNC: 7 U/L (ref 1–33)
ANION GAP SERPL CALCULATED.3IONS-SCNC: 11.9 MMOL/L (ref 5–15)
AST SERPL-CCNC: 21 U/L (ref 1–32)
BILIRUB SERPL-MCNC: 0.4 MG/DL (ref 0–1.2)
BUN SERPL-MCNC: 18 MG/DL (ref 6–20)
BUN/CREAT SERPL: 18.9 (ref 7–25)
CALCIUM SPEC-SCNC: 9.2 MG/DL (ref 8.6–10.5)
CHLORIDE SERPL-SCNC: 103 MMOL/L (ref 98–107)
CO2 SERPL-SCNC: 27.1 MMOL/L (ref 22–29)
CORTIS SERPL-MCNC: 9.02 MCG/DL
CREAT SERPL-MCNC: 0.95 MG/DL (ref 0.57–1)
GFR SERPL CREATININE-BSD FRML MDRD: 60 ML/MIN/1.73
GLOBULIN UR ELPH-MCNC: 2.6 GM/DL
GLUCOSE SERPL-MCNC: 101 MG/DL (ref 65–99)
HBA1C MFR BLD: 5.6 % (ref 4.8–5.6)
POTASSIUM SERPL-SCNC: 4.1 MMOL/L (ref 3.5–5.2)
PROT SERPL-MCNC: 6.9 G/DL (ref 6–8.5)
SODIUM SERPL-SCNC: 142 MMOL/L (ref 136–145)
T4 FREE SERPL-MCNC: 1.1 NG/DL (ref 0.93–1.7)
TSH SERPL DL<=0.05 MIU/L-ACNC: 3.39 UIU/ML (ref 0.27–4.2)

## 2021-01-04 PROCEDURE — 82024 ASSAY OF ACTH: CPT

## 2021-01-04 PROCEDURE — 80053 COMPREHEN METABOLIC PANEL: CPT

## 2021-01-04 PROCEDURE — 83036 HEMOGLOBIN GLYCOSYLATED A1C: CPT

## 2021-01-04 PROCEDURE — 84439 ASSAY OF FREE THYROXINE: CPT

## 2021-01-04 PROCEDURE — 82533 TOTAL CORTISOL: CPT

## 2021-01-04 PROCEDURE — 84443 ASSAY THYROID STIM HORMONE: CPT

## 2021-01-05 LAB — ACTH PLAS-MCNC: 17 PG/ML (ref 7.2–63.3)

## 2021-01-06 PROCEDURE — 82530 CORTISOL FREE: CPT

## 2021-01-06 PROCEDURE — 81050 URINALYSIS VOLUME MEASURE: CPT

## 2021-01-08 ENCOUNTER — HOSPITAL ENCOUNTER (OUTPATIENT)
Dept: CARDIOLOGY | Facility: HOSPITAL | Age: 61
Discharge: HOME | End: 2021-01-08

## 2021-01-08 ENCOUNTER — TRANSCRIBE ORDERS (OUTPATIENT)
Dept: LAB | Facility: HOSPITAL | Age: 61
End: 2021-01-08

## 2021-01-08 ENCOUNTER — HOSPITAL ENCOUNTER (OUTPATIENT)
Dept: GENERAL RADIOLOGY | Facility: HOSPITAL | Age: 61
Discharge: HOME | End: 2021-01-08

## 2021-01-08 ENCOUNTER — LAB (OUTPATIENT)
Dept: LAB | Facility: HOSPITAL | Age: 61
End: 2021-01-08

## 2021-01-08 DIAGNOSIS — Z01.818 OTHER SPECIFIED PRE-OPERATIVE EXAMINATION: ICD-10-CM

## 2021-01-08 DIAGNOSIS — Z87.898 PERSONAL HISTORY OF OTHER LYMPHATIC AND HEMATOPOIETIC NEOPLASM: Primary | ICD-10-CM

## 2021-01-08 DIAGNOSIS — Z87.898 PERSONAL HISTORY OF OTHER LYMPHATIC AND HEMATOPOIETIC NEOPLASM: ICD-10-CM

## 2021-01-08 DIAGNOSIS — R73.09 IMPAIRED GLUCOSE TOLERANCE TEST: ICD-10-CM

## 2021-01-08 DIAGNOSIS — Z01.818 PREOPERATIVE CLEARANCE: Primary | ICD-10-CM

## 2021-01-08 LAB
BASOPHILS # BLD AUTO: 0.06 10*3/MM3 (ref 0–0.2)
BASOPHILS NFR BLD AUTO: 1.2 % (ref 0–1.5)
DEPRECATED RDW RBC AUTO: 39.5 FL (ref 37–54)
EOSINOPHIL # BLD AUTO: 0.28 10*3/MM3 (ref 0–0.4)
EOSINOPHIL NFR BLD AUTO: 5.8 % (ref 0.3–6.2)
ERYTHROCYTE [DISTWIDTH] IN BLOOD BY AUTOMATED COUNT: 11.8 % (ref 12.3–15.4)
HCT VFR BLD AUTO: 46.1 % (ref 34–46.6)
HGB BLD-MCNC: 15.6 G/DL (ref 12–15.9)
IMM GRANULOCYTES # BLD AUTO: 0.01 10*3/MM3 (ref 0–0.05)
IMM GRANULOCYTES NFR BLD AUTO: 0.2 % (ref 0–0.5)
LYMPHOCYTES # BLD AUTO: 1.48 10*3/MM3 (ref 0.7–3.1)
LYMPHOCYTES NFR BLD AUTO: 30.6 % (ref 19.6–45.3)
MCH RBC QN AUTO: 31.1 PG (ref 26.6–33)
MCHC RBC AUTO-ENTMCNC: 33.8 G/DL (ref 31.5–35.7)
MCV RBC AUTO: 92 FL (ref 79–97)
MONOCYTES # BLD AUTO: 0.43 10*3/MM3 (ref 0.1–0.9)
MONOCYTES NFR BLD AUTO: 8.9 % (ref 5–12)
NEUTROPHILS NFR BLD AUTO: 2.57 10*3/MM3 (ref 1.7–7)
NEUTROPHILS NFR BLD AUTO: 53.3 % (ref 42.7–76)
NRBC BLD AUTO-RTO: 0 /100 WBC (ref 0–0.2)
PLATELET # BLD AUTO: 203 10*3/MM3 (ref 140–450)
PMV BLD AUTO: 11.7 FL (ref 6–12)
RBC # BLD AUTO: 5.01 10*6/MM3 (ref 3.77–5.28)
WBC # BLD AUTO: 4.83 10*3/MM3 (ref 3.4–10.8)

## 2021-01-08 PROCEDURE — 85025 COMPLETE CBC W/AUTO DIFF WBC: CPT

## 2021-01-08 PROCEDURE — 93005 ELECTROCARDIOGRAM TRACING: CPT | Performed by: ORTHOPAEDIC SURGERY

## 2021-01-08 PROCEDURE — 36415 COLL VENOUS BLD VENIPUNCTURE: CPT

## 2021-01-08 PROCEDURE — 71046 X-RAY EXAM CHEST 2 VIEWS: CPT

## 2021-01-10 LAB
CORTIS F 24H UR-MRATE: 10 UG/24 HR (ref 6–42)
CORTIS F UR-MCNC: 5 UG/L

## 2021-01-11 LAB
QT INTERVAL: 384 MS
QTC INTERVAL: 437 MS

## 2021-01-12 ENCOUNTER — LAB (OUTPATIENT)
Dept: LAB | Facility: HOSPITAL | Age: 61
End: 2021-01-12

## 2021-01-12 DIAGNOSIS — Z01.818 PREOPERATIVE CLEARANCE: ICD-10-CM

## 2021-01-12 LAB — SARS-COV-2 RNA RESP QL NAA+PROBE: NOT DETECTED

## 2021-01-12 PROCEDURE — C9803 HOPD COVID-19 SPEC COLLECT: HCPCS

## 2021-01-12 PROCEDURE — U0004 COV-19 TEST NON-CDC HGH THRU: HCPCS

## 2021-01-15 RX ORDER — OXYCODONE HYDROCHLORIDE 5 MG/1
5 TABLET ORAL EVERY 4 HOURS PRN
Qty: 25 TABLET | Refills: 0 | Status: SHIPPED | OUTPATIENT
Start: 2021-01-15 | End: 2021-01-18 | Stop reason: SDUPTHER

## 2021-01-18 RX ORDER — OXYCODONE HYDROCHLORIDE 5 MG/1
5 TABLET ORAL EVERY 4 HOURS PRN
Qty: 25 TABLET | Refills: 0 | Status: SHIPPED | OUTPATIENT
Start: 2021-01-18 | End: 2021-03-10

## 2021-03-01 ENCOUNTER — HOSPITAL ENCOUNTER (OUTPATIENT)
Dept: MAMMOGRAPHY | Facility: HOSPITAL | Age: 61
End: 2021-03-01

## 2021-03-10 ENCOUNTER — OFFICE VISIT (OUTPATIENT)
Dept: ENDOCRINOLOGY | Facility: CLINIC | Age: 61
End: 2021-03-10

## 2021-03-10 VITALS
OXYGEN SATURATION: 98 % | HEART RATE: 81 BPM | TEMPERATURE: 97.5 F | WEIGHT: 183.5 LBS | DIASTOLIC BLOOD PRESSURE: 70 MMHG | HEIGHT: 64 IN | SYSTOLIC BLOOD PRESSURE: 115 MMHG | BODY MASS INDEX: 31.33 KG/M2

## 2021-03-10 DIAGNOSIS — E88.81 INSULIN RESISTANCE: ICD-10-CM

## 2021-03-10 DIAGNOSIS — E28.2 PCOS (POLYCYSTIC OVARIAN SYNDROME): Primary | ICD-10-CM

## 2021-03-10 DIAGNOSIS — E66.09 CLASS 1 OBESITY DUE TO EXCESS CALORIES WITHOUT SERIOUS COMORBIDITY WITH BODY MASS INDEX (BMI) OF 32.0 TO 32.9 IN ADULT: ICD-10-CM

## 2021-03-10 DIAGNOSIS — N95.9 POSTMENOPAUSAL SYMPTOMS: ICD-10-CM

## 2021-03-10 PROCEDURE — 99214 OFFICE O/P EST MOD 30 MIN: CPT | Performed by: INTERNAL MEDICINE

## 2021-03-10 RX ORDER — ESTRADIOL 0.03 MG/D
1 FILM, EXTENDED RELEASE TRANSDERMAL 2 TIMES WEEKLY
Qty: 8 PATCH | Refills: 11 | Status: SHIPPED | OUTPATIENT
Start: 2021-03-11 | End: 2021-07-21

## 2021-03-10 NOTE — PROGRESS NOTES
Subjective:   Polycystic Ovary Syndrome (Follow Up:  Patient states that she has not taken the medication as Insurance did not approve it ) and Adrenal Problem        Kelly Meza is a 61 y.o. female who is being seen for follow-up for hyperglycemia, abnormal thyroid function tests and obesity. She has features of insulin resistance.   Patient reported difficulty with weight loss despite diet and exercises. She has tried different weight loss programs, contrave with minimal effect. She had some features of insulin resistance  5/2018 we have started Saxenda and she tolerated medication well.  Lost >50 lbs.  After stopping saxenda she regained weight.      Review of Systems  Review of Systems   Constitutional: Positive for fatigue and unexpected weight change (weight gain ). Negative for activity change, chills. Positive for hot flashes and night sweats.    Musculoskeletal: Positive for back pain and neck pain. Psychiatric/Behavioral: Positive for sleep disturbance. The patient is nervous/anxious.    All other systems reviewed and are negative.     Current medications:  Current Outpatient Medications   Medication Sig Dispense Refill   • amantadine (SYMMETREL) 100 MG tablet Take 100 mg by mouth 2 (Two) Times a Day.     • BD Pen Needle Gregoria U/F 32G X 4 MM misc use with pens once daily as directed 100 each 0   • Carbidopa-Levodopa ER (Rytary) 23.75-95 MG capsule controlled-release Take 1 capsule by mouth 3 (Three) Times a Day.     • cholecalciferol (VITAMIN D3) 25 MCG (1000 UT) tablet Take 1,000 Units by mouth Daily.     • Clocortolone Pivalate Pump 0.1 % cream Apply  topically.     • clonazePAM (KlonoPIN) 1 MG tablet Take 1 mg by mouth At Night As Needed.     • Liraglutide -Weight Management (Saxenda) 18 MG/3ML solution pen-injector Inject 3 mg under the skin into the appropriate area as directed Daily. 4 pen 6   • metroNIDAZOLE (METROCREAM) 0.75 % cream apply TOPICALLY to face ONCE DAILY as directed  99   •  Multiple Vitamins-Minerals (MULTIVITAMIN ADULT PO) Take  by mouth.     • mupirocin (BACTROBAN) 2 % cream APPLY TOPICALLY TO AFFECTED AREA(S) TWICE A DAY  0   • QUEtiapine (SEROquel) 100 MG tablet TAKE 1 TO 2 TABLETS BY MOUTH 2 HOURS BEFORE  BEDTIME 60 tablet 4   • [START ON 3/11/2021] estradiol (VIVELLE-DOT) 0.025 MG/24HR patch Place 1 patch on the skin as directed by provider 2 (Two) Times a Week. 8 patch 11     No current facility-administered medications for this visit.           Objective:     Vitals:    03/10/21 1127   BP: 115/70   Pulse: 81   Temp: 97.5 °F (36.4 °C)   SpO2: 98%   Body mass index is 31.5 kg/m².  Physical Exam  Vitals reviewed.   Constitutional:       Appearance: Normal appearance.   Cardiovascular:      Rate and Rhythm: Normal rate and regular rhythm.      Pulses: Normal pulses.      Heart sounds: Normal heart sounds.   Pulmonary:      Effort: Pulmonary effort is normal.      Breath sounds: Normal breath sounds.   Musculoskeletal:         General: No swelling.   Neurological:      Mental Status: She is alert and oriented to person, place, and time.   Psychiatric:         Mood and Affect: Mood normal.         Thought Content: Thought content normal.           LABS AND IMAGING        Results for orders placed or performed in visit on 01/12/21   COVID-19 PCR, [facility], NP SWAB IN [facility] VIRAL TRANSPORT MEDIA 24-30 HR TAT - Swab, Nasopharynx    Specimen: Nasopharynx; Swab   Result Value Ref Range    SARS-CoV-2 TRAVIS Not Detected Not Detected        Assessment:        Problem List Items Addressed This Visit        Other    PCOS (polycystic ovarian syndrome) - Primary    Class 1 obesity due to excess calories without serious comorbidity with body mass index (BMI) of 32.0 to 32.9 in adult    Insulin resistance    Postmenopausal symptoms               Plan:         Continue with the weight loss program   Saxenda restarted and I recommended slowly increase to 1.8 and 2.4. This should be effective with  insulin resistance symptoms as well.     Combipatch was not covered by insurance. I have written rx for the Smooth irizarry. She has problems swallowing due to Parkinson's and swallowing the pills is a problems. Topical HRT is medically necessary.         Follow-up in 3-4  months

## 2021-05-05 ENCOUNTER — APPOINTMENT (OUTPATIENT)
Dept: MAMMOGRAPHY | Facility: HOSPITAL | Age: 61
End: 2021-05-05

## 2021-06-18 ENCOUNTER — HOSPITAL ENCOUNTER (OUTPATIENT)
Dept: MAMMOGRAPHY | Facility: HOSPITAL | Age: 61
Discharge: HOME | End: 2021-06-18
Admitting: INTERNAL MEDICINE

## 2021-06-18 DIAGNOSIS — Z12.31 ENCOUNTER FOR SCREENING MAMMOGRAM FOR BREAST CANCER: ICD-10-CM

## 2021-06-18 PROCEDURE — 77067 SCR MAMMO BI INCL CAD: CPT

## 2021-06-18 PROCEDURE — 77063 BREAST TOMOSYNTHESIS BI: CPT

## 2021-07-21 ENCOUNTER — OFFICE VISIT (OUTPATIENT)
Dept: ENDOCRINOLOGY | Facility: CLINIC | Age: 61
End: 2021-07-21

## 2021-07-21 ENCOUNTER — LAB (OUTPATIENT)
Dept: LAB | Facility: HOSPITAL | Age: 61
End: 2021-07-21

## 2021-07-21 VITALS
SYSTOLIC BLOOD PRESSURE: 118 MMHG | HEIGHT: 64 IN | DIASTOLIC BLOOD PRESSURE: 78 MMHG | BODY MASS INDEX: 30.85 KG/M2 | WEIGHT: 180.7 LBS | OXYGEN SATURATION: 98 % | HEART RATE: 80 BPM

## 2021-07-21 DIAGNOSIS — N95.9 POSTMENOPAUSAL SYMPTOMS: ICD-10-CM

## 2021-07-21 DIAGNOSIS — E28.2 PCOS (POLYCYSTIC OVARIAN SYNDROME): Primary | ICD-10-CM

## 2021-07-21 DIAGNOSIS — E66.09 CLASS 1 OBESITY DUE TO EXCESS CALORIES WITHOUT SERIOUS COMORBIDITY WITH BODY MASS INDEX (BMI) OF 31.0 TO 31.9 IN ADULT: ICD-10-CM

## 2021-07-21 DIAGNOSIS — E88.81 INSULIN RESISTANCE: ICD-10-CM

## 2021-07-21 LAB
25(OH)D3 SERPL-MCNC: 32.6 NG/ML
ALBUMIN SERPL-MCNC: 4.3 G/DL (ref 3.5–5.2)
ALBUMIN/GLOB SERPL: 2 G/DL
ALP SERPL-CCNC: 107 U/L (ref 39–117)
ALT SERPL W P-5'-P-CCNC: 7 U/L (ref 1–33)
ANION GAP SERPL CALCULATED.3IONS-SCNC: 11.3 MMOL/L (ref 5–15)
AST SERPL-CCNC: 18 U/L (ref 1–32)
BASOPHILS # BLD AUTO: 0.04 10*3/MM3 (ref 0–0.2)
BASOPHILS NFR BLD AUTO: 0.8 % (ref 0–1.5)
BILIRUB SERPL-MCNC: 0.4 MG/DL (ref 0–1.2)
BUN SERPL-MCNC: 12 MG/DL (ref 8–23)
BUN/CREAT SERPL: 15.2 (ref 7–25)
CALCIUM SPEC-SCNC: 8.9 MG/DL (ref 8.6–10.5)
CHLORIDE SERPL-SCNC: 104 MMOL/L (ref 98–107)
CO2 SERPL-SCNC: 27.7 MMOL/L (ref 22–29)
CREAT SERPL-MCNC: 0.79 MG/DL (ref 0.57–1)
DEPRECATED RDW RBC AUTO: 41.8 FL (ref 37–54)
EOSINOPHIL # BLD AUTO: 0.14 10*3/MM3 (ref 0–0.4)
EOSINOPHIL NFR BLD AUTO: 2.7 % (ref 0.3–6.2)
ERYTHROCYTE [DISTWIDTH] IN BLOOD BY AUTOMATED COUNT: 12.7 % (ref 12.3–15.4)
GFR SERPL CREATININE-BSD FRML MDRD: 74 ML/MIN/1.73
GLOBULIN UR ELPH-MCNC: 2.2 GM/DL
GLUCOSE SERPL-MCNC: 88 MG/DL (ref 65–99)
HBA1C MFR BLD: 5.5 % (ref 4.8–5.6)
HCT VFR BLD AUTO: 42.2 % (ref 34–46.6)
HGB BLD-MCNC: 14.6 G/DL (ref 12–15.9)
IMM GRANULOCYTES # BLD AUTO: 0.01 10*3/MM3 (ref 0–0.05)
IMM GRANULOCYTES NFR BLD AUTO: 0.2 % (ref 0–0.5)
LYMPHOCYTES # BLD AUTO: 1.36 10*3/MM3 (ref 0.7–3.1)
LYMPHOCYTES NFR BLD AUTO: 25.9 % (ref 19.6–45.3)
MCH RBC QN AUTO: 31.5 PG (ref 26.6–33)
MCHC RBC AUTO-ENTMCNC: 34.6 G/DL (ref 31.5–35.7)
MCV RBC AUTO: 91.1 FL (ref 79–97)
MONOCYTES # BLD AUTO: 0.52 10*3/MM3 (ref 0.1–0.9)
MONOCYTES NFR BLD AUTO: 9.9 % (ref 5–12)
NEUTROPHILS NFR BLD AUTO: 3.19 10*3/MM3 (ref 1.7–7)
NEUTROPHILS NFR BLD AUTO: 60.5 % (ref 42.7–76)
NRBC BLD AUTO-RTO: 0 /100 WBC (ref 0–0.2)
PLATELET # BLD AUTO: 171 10*3/MM3 (ref 140–450)
PMV BLD AUTO: 13 FL (ref 6–12)
POTASSIUM SERPL-SCNC: 3.8 MMOL/L (ref 3.5–5.2)
PROT SERPL-MCNC: 6.5 G/DL (ref 6–8.5)
RBC # BLD AUTO: 4.63 10*6/MM3 (ref 3.77–5.28)
SODIUM SERPL-SCNC: 143 MMOL/L (ref 136–145)
TSH SERPL DL<=0.05 MIU/L-ACNC: 2.59 UIU/ML (ref 0.27–4.2)
VIT B12 BLD-MCNC: 673 PG/ML (ref 211–946)
WBC # BLD AUTO: 5.26 10*3/MM3 (ref 3.4–10.8)

## 2021-07-21 PROCEDURE — 85025 COMPLETE CBC W/AUTO DIFF WBC: CPT | Performed by: INTERNAL MEDICINE

## 2021-07-21 PROCEDURE — 99214 OFFICE O/P EST MOD 30 MIN: CPT | Performed by: INTERNAL MEDICINE

## 2021-07-21 PROCEDURE — 80053 COMPREHEN METABOLIC PANEL: CPT | Performed by: INTERNAL MEDICINE

## 2021-07-21 PROCEDURE — 84443 ASSAY THYROID STIM HORMONE: CPT | Performed by: INTERNAL MEDICINE

## 2021-07-21 PROCEDURE — 82607 VITAMIN B-12: CPT | Performed by: INTERNAL MEDICINE

## 2021-07-21 PROCEDURE — 82306 VITAMIN D 25 HYDROXY: CPT | Performed by: INTERNAL MEDICINE

## 2021-07-21 PROCEDURE — 83036 HEMOGLOBIN GLYCOSYLATED A1C: CPT | Performed by: INTERNAL MEDICINE

## 2021-07-21 NOTE — PROGRESS NOTES
"  Subjective:   Polycystic Ovary Syndrome (Follow Up; Stated she as not been using the \"shots\" Saxenda?? )      Kelly Meza is a 61 y.o. female who is being seen for follow-up for hyperglycemia, abnormal thyroid function tests and obesity. She has features of insulin resistance.   Patient reported difficulty with weight loss despite diet and exercises. She has tried different weight loss programs, contrave with minimal effect. She had some features of insulin resistance  5/2018 we have started Saxenda and she tolerated medication well.  Lost >50 lbs.  She has discontinued the medication due to other med problems.     She has taken off the medications for Parkinson's after she developed dyskinesia . Parkinsons symptoms are worse now - stiffness and tremors.  Restarting the medication at lower dose caused severe dyskinesia.   Shoulder surgery in Jan resulted in stiffness.      Review of Systems  Review of Systems   Musculoskeletal: Positive for gait problem.   Neurological: Positive for tremors (and abnormal movements. ) and facial asymmetry.      Current medications:  Current Outpatient Medications   Medication Sig Dispense Refill   • amantadine (SYMMETREL) 100 MG tablet Take 100 mg by mouth 2 (Two) Times a Day.     • BD Pen Needle Gregoria U/F 32G X 4 MM misc use with pens once daily as directed 100 each 0   • Carbidopa-Levodopa ER (Rytary) 23.75-95 MG capsule controlled-release Take 1 capsule by mouth 3 (Three) Times a Day.     • cholecalciferol (VITAMIN D3) 25 MCG (1000 UT) tablet Take 1,000 Units by mouth Daily.     • Clocortolone Pivalate Pump 0.1 % cream Apply  topically.     • clonazePAM (KlonoPIN) 1 MG tablet Take 1 mg by mouth At Night As Needed.     • estradiol (VIVELLE-DOT) 0.025 MG/24HR patch Place 1 patch on the skin as directed by provider 2 (Two) Times a Week. 8 patch 11   • Liraglutide -Weight Management (Saxenda) 18 MG/3ML solution pen-injector Inject 3 mg under the skin into the appropriate area " as directed Daily. 4 pen 6   • metroNIDAZOLE (METROCREAM) 0.75 % cream apply TOPICALLY to face ONCE DAILY as directed  99   • Multiple Vitamins-Minerals (MULTIVITAMIN ADULT PO) Take  by mouth.     • mupirocin (BACTROBAN) 2 % cream APPLY TOPICALLY TO AFFECTED AREA(S) TWICE A DAY  0   • QUEtiapine (SEROquel) 100 MG tablet TAKE 1 TO 2 TABLETS BY MOUTH 2 HOURS BEFORE  BEDTIME 60 tablet 4     No current facility-administered medications for this visit.           Objective:     Vitals:    07/21/21 1105   BP: 118/78   Pulse: 80   SpO2: 98%   Body mass index is 31.02 kg/m².  Physical Exam  Vitals reviewed.   Constitutional:       Appearance: Normal appearance.   Cardiovascular:      Rate and Rhythm: Normal rate and regular rhythm.      Pulses: Normal pulses.      Heart sounds: Normal heart sounds.   Pulmonary:      Effort: Pulmonary effort is normal.      Breath sounds: Normal breath sounds.   Musculoskeletal:         General: No swelling.   Neurological:      Mental Status: She is alert and oriented to person, place, and time.   Psychiatric:         Mood and Affect: Mood normal.         Thought Content: Thought content normal.           LABS AND IMAGING        Results for orders placed or performed in visit on 01/12/21   COVID-19 PCR, [facility], NP SWAB IN [place] VIRAL TRANSPORT MEDIA 24-30 HR TAT - Swab, Nasopharynx    Specimen: Nasopharynx; Swab   Result Value Ref Range    SARS-CoV-2 TRAVIS Not Detected Not Detected        Assessment:        Problem List Items Addressed This Visit        Other    PCOS (polycystic ovarian syndrome) - Primary    Class 1 obesity due to excess calories without serious comorbidity with body mass index (BMI) of 31.0 to 31.9 in adult    Insulin resistance    Postmenopausal symptoms               Plan:         Continue with the weight loss program . Her main problems now are uncontrolled Parkinson's disease and dyskinesia associated with carbidopa/levodopa.   She wants to restart Saxenda and I have  given her instructions to start at 0.6 and slowly titrate up to 2.4 mg      Smooth dot was given for sx but she didn't start taking it because of side effects. I have discontinued the       Follow-up in 3-4  months

## 2021-07-30 ENCOUNTER — APPOINTMENT (OUTPATIENT)
Dept: MAMMOGRAPHY | Facility: HOSPITAL | Age: 61
End: 2021-07-30

## 2021-09-03 ENCOUNTER — LAB (OUTPATIENT)
Dept: LAB | Facility: HOSPITAL | Age: 61
End: 2021-09-03

## 2021-09-03 ENCOUNTER — TRANSCRIBE ORDERS (OUTPATIENT)
Dept: LAB | Facility: HOSPITAL | Age: 61
End: 2021-09-03

## 2021-09-03 DIAGNOSIS — Z01.818 OTHER SPECIFIED PRE-OPERATIVE EXAMINATION: ICD-10-CM

## 2021-09-03 DIAGNOSIS — Z87.898 PERSONAL HISTORY OF OTHER LYMPHATIC AND HEMATOPOIETIC NEOPLASM: Primary | ICD-10-CM

## 2021-09-03 DIAGNOSIS — R73.09 IMPAIRED GLUCOSE TOLERANCE TEST: ICD-10-CM

## 2021-09-03 DIAGNOSIS — Z87.898 PERSONAL HISTORY OF OTHER LYMPHATIC AND HEMATOPOIETIC NEOPLASM: ICD-10-CM

## 2021-09-03 LAB
ANION GAP SERPL CALCULATED.3IONS-SCNC: 11.2 MMOL/L (ref 5–15)
BUN SERPL-MCNC: 12 MG/DL (ref 8–23)
BUN/CREAT SERPL: 16.2 (ref 7–25)
CALCIUM SPEC-SCNC: 9.5 MG/DL (ref 8.6–10.5)
CHLORIDE SERPL-SCNC: 106 MMOL/L (ref 98–107)
CO2 SERPL-SCNC: 25.8 MMOL/L (ref 22–29)
CREAT SERPL-MCNC: 0.74 MG/DL (ref 0.57–1)
DEPRECATED RDW RBC AUTO: 38.4 FL (ref 37–54)
ERYTHROCYTE [DISTWIDTH] IN BLOOD BY AUTOMATED COUNT: 11.4 % (ref 12.3–15.4)
GFR SERPL CREATININE-BSD FRML MDRD: 80 ML/MIN/1.73
GLUCOSE SERPL-MCNC: 100 MG/DL (ref 65–99)
HBA1C MFR BLD: 5.4 % (ref 4.8–5.6)
HCT VFR BLD AUTO: 42.1 % (ref 34–46.6)
HGB BLD-MCNC: 14.6 G/DL (ref 12–15.9)
MCH RBC QN AUTO: 31.7 PG (ref 26.6–33)
MCHC RBC AUTO-ENTMCNC: 34.7 G/DL (ref 31.5–35.7)
MCV RBC AUTO: 91.5 FL (ref 79–97)
PLATELET # BLD AUTO: 183 10*3/MM3 (ref 140–450)
PMV BLD AUTO: 12.6 FL (ref 6–12)
POTASSIUM SERPL-SCNC: 3.6 MMOL/L (ref 3.5–5.2)
RBC # BLD AUTO: 4.6 10*6/MM3 (ref 3.77–5.28)
SODIUM SERPL-SCNC: 143 MMOL/L (ref 136–145)
WBC # BLD AUTO: 4.34 10*3/MM3 (ref 3.4–10.8)

## 2021-09-03 PROCEDURE — 83036 HEMOGLOBIN GLYCOSYLATED A1C: CPT

## 2021-09-03 PROCEDURE — 80048 BASIC METABOLIC PNL TOTAL CA: CPT

## 2021-09-03 PROCEDURE — 36415 COLL VENOUS BLD VENIPUNCTURE: CPT

## 2021-09-03 PROCEDURE — 85027 COMPLETE CBC AUTOMATED: CPT

## 2021-09-13 ENCOUNTER — LAB (OUTPATIENT)
Dept: LAB | Facility: HOSPITAL | Age: 61
End: 2021-09-13

## 2021-09-13 ENCOUNTER — TRANSCRIBE ORDERS (OUTPATIENT)
Dept: LAB | Facility: HOSPITAL | Age: 61
End: 2021-09-13

## 2021-09-13 DIAGNOSIS — Z20.822 COVID-19 RULED OUT: ICD-10-CM

## 2021-09-13 DIAGNOSIS — Z20.822 COVID-19 RULED OUT: Primary | ICD-10-CM

## 2021-09-13 PROCEDURE — C9803 HOPD COVID-19 SPEC COLLECT: HCPCS

## 2021-09-13 PROCEDURE — U0004 COV-19 TEST NON-CDC HGH THRU: HCPCS

## 2021-09-14 LAB — SARS-COV-2 RNA NOSE QL NAA+PROBE: NOT DETECTED

## 2021-10-08 ENCOUNTER — TELEPHONE (OUTPATIENT)
Dept: ENDOCRINOLOGY | Facility: CLINIC | Age: 61
End: 2021-10-08

## 2021-10-08 NOTE — TELEPHONE ENCOUNTER
PA was submitted via CMM for Saxenda on 9/27/21 Denied within 24 hours states they did not receive the additional  information needed to process the PA. Submitted an Appeal stating did ot know they needed additional information, was not stated on CMM.  Received denial to the appeal stating Not medically necessary, in the first paragraph, in the second paragraph it states pt did ot lose 4% of body weight while previously on Saxenda. Pt has an appt in December, can discuss at appt.

## 2021-11-01 ENCOUNTER — LAB (OUTPATIENT)
Dept: LAB | Facility: HOSPITAL | Age: 61
End: 2021-11-01

## 2021-11-01 ENCOUNTER — TRANSCRIBE ORDERS (OUTPATIENT)
Dept: LAB | Facility: HOSPITAL | Age: 61
End: 2021-11-01

## 2021-11-01 DIAGNOSIS — Z01.818 PRE-OP TESTING: Primary | ICD-10-CM

## 2021-11-01 DIAGNOSIS — Z01.818 PRE-OP TESTING: ICD-10-CM

## 2021-11-01 PROCEDURE — U0004 COV-19 TEST NON-CDC HGH THRU: HCPCS

## 2021-11-02 LAB — SARS-COV-2 RNA NOSE QL NAA+PROBE: NOT DETECTED

## 2021-12-07 ENCOUNTER — OFFICE VISIT (OUTPATIENT)
Dept: ENDOCRINOLOGY | Facility: CLINIC | Age: 61
End: 2021-12-07

## 2021-12-07 ENCOUNTER — TELEPHONE (OUTPATIENT)
Dept: ENDOCRINOLOGY | Facility: CLINIC | Age: 61
End: 2021-12-07

## 2021-12-07 VITALS
OXYGEN SATURATION: 98 % | HEIGHT: 64 IN | BODY MASS INDEX: 31.62 KG/M2 | SYSTOLIC BLOOD PRESSURE: 122 MMHG | HEART RATE: 80 BPM | DIASTOLIC BLOOD PRESSURE: 78 MMHG | WEIGHT: 185.2 LBS

## 2021-12-07 DIAGNOSIS — E88.81 INSULIN RESISTANCE: Primary | ICD-10-CM

## 2021-12-07 DIAGNOSIS — E66.09 CLASS 1 OBESITY DUE TO EXCESS CALORIES WITHOUT SERIOUS COMORBIDITY WITH BODY MASS INDEX (BMI) OF 31.0 TO 31.9 IN ADULT: ICD-10-CM

## 2021-12-07 DIAGNOSIS — G20 PARKINSON'S DISEASE (HCC): ICD-10-CM

## 2021-12-07 DIAGNOSIS — E28.2 PCOS (POLYCYSTIC OVARIAN SYNDROME): ICD-10-CM

## 2021-12-07 PROCEDURE — 99214 OFFICE O/P EST MOD 30 MIN: CPT | Performed by: INTERNAL MEDICINE

## 2021-12-07 RX ORDER — SEMAGLUTIDE 1 MG/.5ML
1 INJECTION, SOLUTION SUBCUTANEOUS WEEKLY
Qty: 2 ML | Refills: 3 | Status: SHIPPED | OUTPATIENT
Start: 2021-12-07 | End: 2022-02-22

## 2021-12-07 NOTE — PROGRESS NOTES
Subjective:   Diabetes (Follow UP:  Pt states she has not started taking the Saxenda due to shoulder surgery, but wants to start taking it tomorrow. PA was submitted and Denied, Appeal was denied as well. not covered by plan, however PT wasnt to appeal decision. )      Kelly Meza is a 61 y.o. female who is being seen for follow-up for hyperglycemia, abnormal thyroid function tests and obesity. She has features of insulin resistance.   Patient reported difficulty with weight loss despite diet and exercises. She has tried different weight loss programs, contrave with minimal effect. She had some features of insulin resistance  5/2018 we have started Saxenda and she tolerated medication well.  Lost >50 lbs.  She has discontinued the medication due to other med problems.   Insurance stopped covering it because of no weight loss. She was not taking it since September. She is off the pain meds and would like to go back to medication.   They had restarted Saxenda and increased to 1.2 mg daily. She is not able to administer injections due to Parkinson's and her  administers the injection. She has bruising of the abdomen and often skips the medications. Her  has sleeping disorder and often forget to administer the injection.   She has gained some weight.     She has taken off the medications for Parkinson's after she developed dyskinesia . Parkinsons symptoms are worse now - stiffness and tremors.  Restarting the medication at lower dose caused severe dyskinesia.     Patient had repeated shoulder surgery in Jan and in Sep. A1C was 5.4 in Sep 2021       Review of Systems  Review of Systems   Constitutional: Positive for fatigue and unexpected weight gain.   Musculoskeletal: Positive for gait problem.   Neurological: Positive for tremors (and abnormal movements. ) and facial asymmetry.      Current medications:  Current Outpatient Medications   Medication Sig Dispense Refill   • BD Pen Needle Gregoria U/F  32G X 4 MM misc use with pens once daily as directed 100 each 0   • Carbidopa-Levodopa ER (Rytary) 23.75-95 MG capsule controlled-release Take 1 capsule by mouth 3 (Three) Times a Day.     • cholecalciferol (VITAMIN D3) 25 MCG (1000 UT) tablet Take 1,000 Units by mouth Daily.     • Clocortolone Pivalate Pump 0.1 % cream Apply  topically.     • clonazePAM (KlonoPIN) 1 MG tablet Take 1 mg by mouth At Night As Needed.     • metroNIDAZOLE (METROCREAM) 0.75 % cream apply TOPICALLY to face ONCE DAILY as directed  99   • Multiple Vitamins-Minerals (MULTIVITAMIN ADULT PO) Take  by mouth.     • mupirocin (BACTROBAN) 2 % cream APPLY TOPICALLY TO AFFECTED AREA(S) TWICE A DAY  0   • QUEtiapine (SEROquel) 100 MG tablet TAKE 1 TO 2 TABLETS BY MOUTH 2 HOURS BEFORE  BEDTIME 60 tablet 4   • Liraglutide -Weight Management (Saxenda) 18 MG/3ML solution pen-injector Inject 3 mg under the skin into the appropriate area as directed Daily. 4 pen 6     No current facility-administered medications for this visit.           Objective:     Vitals:    12/07/21 1002   BP: 122/78   Pulse: 80   SpO2: 98%   Body mass index is 31.79 kg/m².  Physical Exam  Vitals reviewed.   Constitutional:       Appearance: Normal appearance.   Cardiovascular:      Rate and Rhythm: Normal rate and regular rhythm.      Pulses: Normal pulses.      Heart sounds: Normal heart sounds.   Pulmonary:      Effort: Pulmonary effort is normal.      Breath sounds: Normal breath sounds.   Musculoskeletal:         General: No swelling.   Neurological:      Mental Status: She is alert and oriented to person, place, and time.      Comments: Tardive dyskinesia notes. Stiffnes of the muscles.    Psychiatric:         Mood and Affect: Mood normal.         Thought Content: Thought content normal.           LABS AND IMAGING        Results for orders placed or performed in visit on 11/01/21   COVID-19 PCR, [facility], NP SWAB IN [place] VIRAL TRANSPORT MEDIA/ORAL SWISH 24-30 HR TAT - Swab,  Nasopharynx    Specimen: Nasopharynx; Swab   Result Value Ref Range    SARS-CoV-2 TRAVIS Not Detected Not Detected        Assessment:        Problem List Items Addressed This Visit        Other    PCOS (polycystic ovarian syndrome)    Class 1 obesity due to excess calories without serious comorbidity with body mass index (BMI) of 31.0 to 31.9 in adult    Insulin resistance - Primary    Parkinson's disease (HCC)               Plan:         Continue with the weight loss program, dietary modifications and restart GLP-1.  I will give her a prescription of Wegovy weekly injection. This will provide the weight loss and ease of administration once a week. She had a great results with GLP-1 in the past, change to the weekly injection is medically necessary and will prevent progression of diabetes considering her insulin resistance.          Follow-up in 3-4  months    28 min soent for med discussion and review of the new medication, teaching on administration and side effects/benefits

## 2022-02-22 ENCOUNTER — OFFICE VISIT (OUTPATIENT)
Dept: ENDOCRINOLOGY | Facility: CLINIC | Age: 62
End: 2022-02-22

## 2022-02-22 ENCOUNTER — LAB (OUTPATIENT)
Dept: LAB | Facility: HOSPITAL | Age: 62
End: 2022-02-22

## 2022-02-22 VITALS
HEIGHT: 64 IN | DIASTOLIC BLOOD PRESSURE: 74 MMHG | SYSTOLIC BLOOD PRESSURE: 120 MMHG | OXYGEN SATURATION: 99 % | BODY MASS INDEX: 31.36 KG/M2 | HEART RATE: 90 BPM | WEIGHT: 183.7 LBS

## 2022-02-22 DIAGNOSIS — E28.2 PCOS (POLYCYSTIC OVARIAN SYNDROME): Primary | ICD-10-CM

## 2022-02-22 DIAGNOSIS — E88.81 INSULIN RESISTANCE: ICD-10-CM

## 2022-02-22 DIAGNOSIS — E66.09 CLASS 1 OBESITY DUE TO EXCESS CALORIES WITHOUT SERIOUS COMORBIDITY WITH BODY MASS INDEX (BMI) OF 31.0 TO 31.9 IN ADULT: ICD-10-CM

## 2022-02-22 PROCEDURE — 83540 ASSAY OF IRON: CPT | Performed by: INTERNAL MEDICINE

## 2022-02-22 PROCEDURE — 84443 ASSAY THYROID STIM HORMONE: CPT | Performed by: INTERNAL MEDICINE

## 2022-02-22 PROCEDURE — 99214 OFFICE O/P EST MOD 30 MIN: CPT | Performed by: INTERNAL MEDICINE

## 2022-02-22 PROCEDURE — 84439 ASSAY OF FREE THYROXINE: CPT | Performed by: INTERNAL MEDICINE

## 2022-02-22 PROCEDURE — 36415 COLL VENOUS BLD VENIPUNCTURE: CPT | Performed by: INTERNAL MEDICINE

## 2022-02-22 PROCEDURE — 80053 COMPREHEN METABOLIC PANEL: CPT | Performed by: INTERNAL MEDICINE

## 2022-02-22 PROCEDURE — 82728 ASSAY OF FERRITIN: CPT | Performed by: INTERNAL MEDICINE

## 2022-02-22 RX ORDER — SEMAGLUTIDE 1.7 MG/.75ML
1.7 INJECTION, SOLUTION SUBCUTANEOUS WEEKLY
Qty: 3 ML | Refills: 6 | Status: SHIPPED | OUTPATIENT
Start: 2022-02-22 | End: 2022-05-18 | Stop reason: DRUGHIGH

## 2022-02-22 RX ORDER — CLOBETASOL PROPIONATE 0.46 MG/ML
SOLUTION TOPICAL
COMMUNITY
Start: 2022-02-19

## 2022-02-22 RX ORDER — SEMAGLUTIDE 2.4 MG/.75ML
2.4 INJECTION, SOLUTION SUBCUTANEOUS WEEKLY
Qty: 3 ML | Refills: 6 | Status: SHIPPED | OUTPATIENT
Start: 2022-02-22 | End: 2022-05-18 | Stop reason: SDUPTHER

## 2022-02-22 NOTE — PROGRESS NOTES
Subjective:   Polycystic Ovary Syndrome (Follow Up:  Not losing weight, even though Sophie states Not eating enough)      Kelly Meza is a 62 y.o. female who is being seen for follow-up for hyperglycemia, abnormal thyroid function tests and obesity. She has features of insulin resistance.   Patient reported difficulty with weight loss despite diet and exercises. She has tried different weight loss programs, contrave with minimal effect. She had some features of insulin resistance  5/2018 we have started Saxenda and she tolerated medication well.  Lost >50 lbs.  She has discontinued the medication due to other med problems and difficulty with daily injections.     We have started Wegovy and she has tolerated it well. Her weight is decreased      Review of Systems  Review of Systems   Constitutional: Positive for fatigue.   Musculoskeletal: Positive for gait problem.   Neurological: Positive for tremors (and abnormal movements. ) and facial asymmetry.      Current medications:  Current Outpatient Medications   Medication Sig Dispense Refill   • BD Pen Needle Gregoria U/F 32G X 4 MM misc use with pens once daily as directed 100 each 0   • Carbidopa-Levodopa ER (Rytary) 23.75-95 MG capsule controlled-release Take 1 capsule by mouth 3 (Three) Times a Day.     • cholecalciferol (VITAMIN D3) 25 MCG (1000 UT) tablet Take 1,000 Units by mouth Daily.     • Clocortolone Pivalate Pump 0.1 % cream Apply  topically.     • clonazePAM (KlonoPIN) 1 MG tablet Take 1 mg by mouth At Night As Needed.     • metroNIDAZOLE (METROCREAM) 0.75 % cream apply TOPICALLY to face ONCE DAILY as directed  99   • Multiple Vitamins-Minerals (MULTIVITAMIN ADULT PO) Take  by mouth.     • mupirocin (BACTROBAN) 2 % cream APPLY TOPICALLY TO AFFECTED AREA(S) TWICE A DAY  0   • QUEtiapine (SEROquel) 100 MG tablet TAKE 1 TO 2 TABLETS BY MOUTH 2 HOURS BEFORE  BEDTIME 60 tablet 4   • Semaglutide-Weight Management (Wegovy) 1 MG/0.5ML solution auto-injector  Inject 1 mg under the skin into the appropriate area as directed 1 (One) Time Per Week. 2 mL 3   • clobetasol (TEMOVATE) 0.05 % external solution        No current facility-administered medications for this visit.           Objective:     Vitals:    02/22/22 0953   BP: 120/74   Pulse: 90   SpO2: 99%   Body mass index is 31.53 kg/m².  Physical Exam  Vitals reviewed.   Constitutional:       Appearance: Normal appearance.   Cardiovascular:      Rate and Rhythm: Normal rate and regular rhythm.      Pulses: Normal pulses.      Heart sounds: Normal heart sounds.   Pulmonary:      Effort: Pulmonary effort is normal.      Breath sounds: Normal breath sounds.   Musculoskeletal:         General: No swelling.   Neurological:      Mental Status: She is alert and oriented to person, place, and time.      Comments: Tardive dyskinesia notes. Stiffnes of the muscles.    Psychiatric:         Mood and Affect: Mood normal.         Thought Content: Thought content normal.           LABS AND IMAGING        Results for orders placed or performed in visit on 11/01/21   COVID-19 PCR, [facility], NP SWAB IN [place] VIRAL TRANSPORT MEDIA/ORAL SWISH 24-30 HR TAT - Swab, Nasopharynx    Specimen: Nasopharynx; Swab   Result Value Ref Range    SARS-CoV-2 TRAVIS Not Detected Not Detected        Assessment:        Problem List Items Addressed This Visit        Other    PCOS (polycystic ovarian syndrome) - Primary    Class 1 obesity due to excess calories without serious comorbidity with body mass index (BMI) of 31.0 to 31.9 in adult    Insulin resistance               Plan:         Continue Wegovy and increase the dose to 1.7 mg and in 1 month to 2.4 mg if needed.       Follow-up in 3-4  months

## 2022-02-23 LAB
ALBUMIN SERPL-MCNC: 4.2 G/DL (ref 3.5–5.2)
ALBUMIN/GLOB SERPL: 1.4 G/DL
ALP SERPL-CCNC: 104 U/L (ref 39–117)
ALT SERPL W P-5'-P-CCNC: 8 U/L (ref 1–33)
ANION GAP SERPL CALCULATED.3IONS-SCNC: 11.4 MMOL/L (ref 5–15)
AST SERPL-CCNC: 17 U/L (ref 1–32)
BILIRUB SERPL-MCNC: 0.4 MG/DL (ref 0–1.2)
BUN SERPL-MCNC: 12 MG/DL (ref 8–23)
BUN/CREAT SERPL: 16.2 (ref 7–25)
CALCIUM SPEC-SCNC: 9.8 MG/DL (ref 8.6–10.5)
CHLORIDE SERPL-SCNC: 104 MMOL/L (ref 98–107)
CO2 SERPL-SCNC: 23.6 MMOL/L (ref 22–29)
CREAT SERPL-MCNC: 0.74 MG/DL (ref 0.57–1)
FERRITIN SERPL-MCNC: 124 NG/ML (ref 13–150)
GFR SERPL CREATININE-BSD FRML MDRD: 80 ML/MIN/1.73
GLOBULIN UR ELPH-MCNC: 2.9 GM/DL
GLUCOSE SERPL-MCNC: 97 MG/DL (ref 65–99)
IRON 24H UR-MRATE: 88 MCG/DL (ref 37–145)
POTASSIUM SERPL-SCNC: 4.1 MMOL/L (ref 3.5–5.2)
PROT SERPL-MCNC: 7.1 G/DL (ref 6–8.5)
SODIUM SERPL-SCNC: 139 MMOL/L (ref 136–145)
T4 FREE SERPL-MCNC: 1.14 NG/DL (ref 0.93–1.7)
TSH SERPL DL<=0.05 MIU/L-ACNC: 2.59 UIU/ML (ref 0.27–4.2)

## 2022-05-13 NOTE — TELEPHONE ENCOUNTER
Patient called stated her pharmacy can no longer get Wegovy and she needs to know what she should do. Please advise.

## 2022-05-17 NOTE — TELEPHONE ENCOUNTER
Per our weekly e-mail    STILL A SHORTAGE OF WEGOVY LOW DOSES-REMEMBER YOU CAN PUT PT’S ON SAXENDA AND WHEN THEY ARE AT MAX DOSE YOU CAN SWITCH THEM TO THE HIGHER DOSE OF WEGOVY

## 2022-05-17 NOTE — TELEPHONE ENCOUNTER
Is the pharmacy doesn't carry Wegovy or insurance stopped covering. If it is a pharmacy, she may have to call another pharmacy and transfer rx

## 2022-05-18 RX ORDER — SEMAGLUTIDE 2.4 MG/.75ML
2.4 INJECTION, SOLUTION SUBCUTANEOUS WEEKLY
Qty: 3 ML | Refills: 3 | Status: SHIPPED | OUTPATIENT
Start: 2022-05-18 | End: 2022-05-18 | Stop reason: SDUPTHER

## 2022-05-18 NOTE — TELEPHONE ENCOUNTER
Spoke with pt, Will send to [facility] per PT request.     They would like for it to be sent to [facility] in [facility]

## 2022-05-18 NOTE — TELEPHONE ENCOUNTER
So her dose is not a low dose - she is 1.7 or 2.4 mg dose. She has taken a low dose when she started I believe.

## 2022-05-20 RX ORDER — SEMAGLUTIDE 2.4 MG/.75ML
2.4 INJECTION, SOLUTION SUBCUTANEOUS WEEKLY
Qty: 3 ML | Refills: 3 | Status: SHIPPED | OUTPATIENT
Start: 2022-05-20 | End: 2022-09-09 | Stop reason: SDUPTHER

## 2022-06-01 ENCOUNTER — TELEPHONE (OUTPATIENT)
Dept: ENDOCRINOLOGY | Facility: CLINIC | Age: 62
End: 2022-06-01

## 2022-06-01 NOTE — TELEPHONE ENCOUNTER
Returned pt call. Advised that there is a shortage of Wegovy it may need to be changed to Saxenda. Pt stated she did NOT want to take Saxenda, but take Wegovy due to it being a weekly injection and not daily.   Pt will contact other pharmacies to see who might have it and get the RX transferred there

## 2022-08-12 ENCOUNTER — APPOINTMENT (OUTPATIENT)
Dept: CT IMAGING | Facility: HOSPITAL | Age: 62
End: 2022-08-12

## 2022-08-12 ENCOUNTER — HOSPITAL ENCOUNTER (EMERGENCY)
Facility: HOSPITAL | Age: 62
Discharge: HOME | End: 2022-08-12
Attending: EMERGENCY MEDICINE | Admitting: EMERGENCY MEDICINE

## 2022-08-12 ENCOUNTER — APPOINTMENT (OUTPATIENT)
Dept: GENERAL RADIOLOGY | Facility: HOSPITAL | Age: 62
End: 2022-08-12

## 2022-08-12 VITALS
OXYGEN SATURATION: 93 % | WEIGHT: 170 LBS | TEMPERATURE: 98.6 F | BODY MASS INDEX: 29.02 KG/M2 | RESPIRATION RATE: 18 BRPM | HEART RATE: 100 BPM | HEIGHT: 64 IN | DIASTOLIC BLOOD PRESSURE: 80 MMHG | SYSTOLIC BLOOD PRESSURE: 119 MMHG
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
## 2022-08-12 DIAGNOSIS — U07.1 COVID-19: Primary | ICD-10-CM

## 2022-08-12 DIAGNOSIS — R09.81 NASAL CONGESTION: ICD-10-CM

## 2022-08-12 DIAGNOSIS — M25.512 ACUTE PAIN OF LEFT SHOULDER: ICD-10-CM

## 2022-08-12 LAB
ALBUMIN SERPL-MCNC: 3.9 G/DL (ref 3.5–5.2)
ALBUMIN/GLOB SERPL: 1.5 G/DL
ALP SERPL-CCNC: 101 U/L (ref 39–117)
ALT SERPL W P-5'-P-CCNC: 24 U/L (ref 1–33)
ANION GAP SERPL CALCULATED.3IONS-SCNC: 11.4 MMOL/L (ref 5–15)
AST SERPL-CCNC: 32 U/L (ref 1–32)
BASOPHILS # BLD AUTO: 0.01 10*3/MM3 (ref 0–0.2)
BASOPHILS NFR BLD AUTO: 0.2 % (ref 0–1.5)
BILIRUB SERPL-MCNC: 0.3 MG/DL (ref 0–1.2)
BUN SERPL-MCNC: 12 MG/DL (ref 8–23)
BUN/CREAT SERPL: 16 (ref 7–25)
CALCIUM SPEC-SCNC: 8.8 MG/DL (ref 8.6–10.5)
CHLORIDE SERPL-SCNC: 98 MMOL/L (ref 98–107)
CO2 SERPL-SCNC: 25.6 MMOL/L (ref 22–29)
CREAT SERPL-MCNC: 0.75 MG/DL (ref 0.57–1)
DEPRECATED RDW RBC AUTO: 40.7 FL (ref 37–54)
EGFRCR SERPLBLD CKD-EPI 2021: 90.7 ML/MIN/1.73
EOSINOPHIL # BLD AUTO: 0 10*3/MM3 (ref 0–0.4)
EOSINOPHIL NFR BLD AUTO: 0 % (ref 0.3–6.2)
ERYTHROCYTE [DISTWIDTH] IN BLOOD BY AUTOMATED COUNT: 12.2 % (ref 12.3–15.4)
FLUAV RNA RESP QL NAA+PROBE: NOT DETECTED
FLUBV RNA RESP QL NAA+PROBE: NOT DETECTED
GLOBULIN UR ELPH-MCNC: 2.6 GM/DL
GLUCOSE SERPL-MCNC: 108 MG/DL (ref 65–99)
HCT VFR BLD AUTO: 42.9 % (ref 34–46.6)
HGB BLD-MCNC: 14.8 G/DL (ref 12–15.9)
HOLD SPECIMEN: NORMAL
HOLD SPECIMEN: NORMAL
IMM GRANULOCYTES # BLD AUTO: 0.01 10*3/MM3 (ref 0–0.05)
IMM GRANULOCYTES NFR BLD AUTO: 0.2 % (ref 0–0.5)
LYMPHOCYTES # BLD AUTO: 0.34 10*3/MM3 (ref 0.7–3.1)
LYMPHOCYTES NFR BLD AUTO: 7.7 % (ref 19.6–45.3)
MAGNESIUM SERPL-MCNC: 1.8 MG/DL (ref 1.6–2.4)
MCH RBC QN AUTO: 31 PG (ref 26.6–33)
MCHC RBC AUTO-ENTMCNC: 34.5 G/DL (ref 31.5–35.7)
MCV RBC AUTO: 89.7 FL (ref 79–97)
MONOCYTES # BLD AUTO: 0.59 10*3/MM3 (ref 0.1–0.9)
MONOCYTES NFR BLD AUTO: 13.4 % (ref 5–12)
NEUTROPHILS NFR BLD AUTO: 3.46 10*3/MM3 (ref 1.7–7)
NEUTROPHILS NFR BLD AUTO: 78.5 % (ref 42.7–76)
NRBC BLD AUTO-RTO: 0 /100 WBC (ref 0–0.2)
NT-PROBNP SERPL-MCNC: 93.3 PG/ML (ref 0–900)
PLATELET # BLD AUTO: 106 10*3/MM3 (ref 140–450)
PMV BLD AUTO: 13.1 FL (ref 6–12)
POTASSIUM SERPL-SCNC: 3.7 MMOL/L (ref 3.5–5.2)
PROT SERPL-MCNC: 6.5 G/DL (ref 6–8.5)
RBC # BLD AUTO: 4.78 10*6/MM3 (ref 3.77–5.28)
RBC MORPH BLD: NORMAL
SARS-COV-2 RNA RESP QL NAA+PROBE: DETECTED
SMALL PLATELETS BLD QL SMEAR: NORMAL
SODIUM SERPL-SCNC: 135 MMOL/L (ref 136–145)
TROPONIN T SERPL-MCNC: <0.01 NG/ML (ref 0–0.03)
WBC MORPH BLD: NORMAL
WBC NRBC COR # BLD: 4.41 10*3/MM3 (ref 3.4–10.8)
WHOLE BLOOD HOLD COAG: NORMAL
WHOLE BLOOD HOLD SPECIMEN: NORMAL

## 2022-08-12 PROCEDURE — 93005 ELECTROCARDIOGRAM TRACING: CPT | Performed by: EMERGENCY MEDICINE

## 2022-08-12 PROCEDURE — 73030 X-RAY EXAM OF SHOULDER: CPT

## 2022-08-12 PROCEDURE — 87636 SARSCOV2 & INF A&B AMP PRB: CPT | Performed by: PHYSICIAN ASSISTANT

## 2022-08-12 PROCEDURE — 83880 ASSAY OF NATRIURETIC PEPTIDE: CPT | Performed by: PHYSICIAN ASSISTANT

## 2022-08-12 PROCEDURE — 72125 CT NECK SPINE W/O DYE: CPT

## 2022-08-12 PROCEDURE — 99284 EMERGENCY DEPT VISIT MOD MDM: CPT

## 2022-08-12 PROCEDURE — 80053 COMPREHEN METABOLIC PANEL: CPT | Performed by: PHYSICIAN ASSISTANT

## 2022-08-12 PROCEDURE — 84484 ASSAY OF TROPONIN QUANT: CPT | Performed by: PHYSICIAN ASSISTANT

## 2022-08-12 PROCEDURE — 94640 AIRWAY INHALATION TREATMENT: CPT

## 2022-08-12 PROCEDURE — 70486 CT MAXILLOFACIAL W/O DYE: CPT

## 2022-08-12 PROCEDURE — 85007 BL SMEAR W/DIFF WBC COUNT: CPT | Performed by: PHYSICIAN ASSISTANT

## 2022-08-12 PROCEDURE — 83735 ASSAY OF MAGNESIUM: CPT | Performed by: PHYSICIAN ASSISTANT

## 2022-08-12 PROCEDURE — 25010000002 IOPAMIDOL 61 % SOLUTION: Performed by: EMERGENCY MEDICINE

## 2022-08-12 PROCEDURE — 71275 CT ANGIOGRAPHY CHEST: CPT

## 2022-08-12 PROCEDURE — 85025 COMPLETE CBC W/AUTO DIFF WBC: CPT | Performed by: PHYSICIAN ASSISTANT

## 2022-08-12 PROCEDURE — 70450 CT HEAD/BRAIN W/O DYE: CPT

## 2022-08-12 PROCEDURE — 36415 COLL VENOUS BLD VENIPUNCTURE: CPT

## 2022-08-12 RX ORDER — PSEUDOEPHEDRINE HCL 120 MG/1
120 TABLET, FILM COATED, EXTENDED RELEASE ORAL EVERY 12 HOURS
Qty: 10 TABLET | Refills: 0 | Status: SHIPPED | OUTPATIENT
Start: 2022-08-12 | End: 2022-08-17

## 2022-08-12 RX ORDER — SODIUM CHLORIDE 0.9 % (FLUSH) 0.9 %
10 SYRINGE (ML) INJECTION AS NEEDED
Status: DISCONTINUED | OUTPATIENT
Start: 2022-08-12 | End: 2022-08-12 | Stop reason: HOSPADM

## 2022-08-12 RX ORDER — BROMPHENIRAMINE MALEATE, PSEUDOEPHEDRINE HYDROCHLORIDE, AND DEXTROMETHORPHAN HYDROBROMIDE 2; 30; 10 MG/5ML; MG/5ML; MG/5ML
5 SYRUP ORAL 4 TIMES DAILY PRN
Qty: 118 ML | Refills: 0 | Status: SHIPPED | OUTPATIENT
Start: 2022-08-12 | End: 2022-09-09

## 2022-08-12 RX ORDER — ALBUTEROL SULFATE 90 UG/1
2 AEROSOL, METERED RESPIRATORY (INHALATION) ONCE
Status: COMPLETED | OUTPATIENT
Start: 2022-08-12 | End: 2022-08-12

## 2022-08-12 RX ORDER — AMITRIPTYLINE HYDROCHLORIDE 25 MG/1
25 TABLET, FILM COATED ORAL 2 TIMES DAILY
COMMUNITY

## 2022-08-12 RX ADMIN — SODIUM CHLORIDE 500 ML: 9 INJECTION, SOLUTION INTRAVENOUS at 10:10

## 2022-08-12 RX ADMIN — ALBUTEROL SULFATE 2 PUFF: 90 AEROSOL, METERED RESPIRATORY (INHALATION) at 13:02

## 2022-08-12 RX ADMIN — IOPAMIDOL 100 ML: 612 INJECTION, SOLUTION INTRAVENOUS at 12:03

## 2022-08-12 NOTE — ED NOTES
Pt requested bed pan. Placed pt on bed pan and she was not able void urine. Placed pure wic on pt.

## 2022-08-12 NOTE — ED NOTES
Attempted to apply c collar on pt. Pt did not want to wear collar because it is uncomfortable and says she cannot breathe.

## 2022-08-12 NOTE — ED PROVIDER NOTES
Subjective   Patient is a 62-year-old female history of asthma, basal cell carcinoma, bilateral ovarian cyst, cervical spine disease, GERD, heart murmur, migraines, ovarian cyst, and Parkinson's disease presenting to the ER for evaluation of multiple symptoms.  Patient states her  was exposed to COVID on Sunday 08/07/22 when she started having symptoms of congestion, fatigue, cough, and shortness of breath on Monday 08/8/22.  She states yesterday he got her out of bed to go to the bathroom and she felt that she was going to pass out.  She states that she did lose consciousness and struck her face on the floor.  She thinks she had only lost consciousness for a little while.  She states she did injure her left shoulder and has pain on the left anterior aspect since this fall.  She denies any known fever, vision loss or changes, extremity paresthesias, chest pain, shortness of breath, abdominal pain, emesis, diarrhea, leg pain or swelling, or any other symptoms.  She states she has a history of asthma, does not use inhalers.  She denies any cardiovascular disease, history of DVT or PE.          Review of Systems   Constitutional: Negative for chills and fever.   HENT: Positive for congestion. Negative for ear pain and sore throat.    Eyes: Negative.    Respiratory: Positive for cough. Negative for shortness of breath.    Cardiovascular: Negative for chest pain.   Gastrointestinal: Negative for abdominal pain, diarrhea and vomiting.   Genitourinary: Negative.    Musculoskeletal: Positive for arthralgias.   Skin: Negative.    Allergic/Immunologic: Negative for immunocompromised state.   Neurological: Positive for syncope and headaches.   Psychiatric/Behavioral: Negative.        Past Medical History:   Diagnosis Date   • Asthma    • Basal cell carcinoma of skin    • Bilateral ovarian cysts    • Cervical spine disease    • GERD (gastroesophageal reflux disease)    • Heart murmur    • Migraine    • Ovarian cyst    •  "Parkinson's disease (HCC)    • Skin cancer        Allergies   Allergen Reactions   • Ciprofloxacin Anaphylaxis   • Latex Hives and Shortness Of Breath   • Amoxicillin Nausea Only   • Codeine Sulfate Nausea And Vomiting   • Grass    • Metal Swelling   • Mold Extract [Trichophyton] Unknown (See Comments)     unknow   • Other      Animal dander   • Paxil [Paroxetine Hcl] Other (See Comments)     Noticeable mood changes   • Pollen Extract Unknown (See Comments)     unknow       Past Surgical History:   Procedure Laterality Date   • BACK SURGERY     • BREAST BIOPSY     • BREAST LUMPECTOMY Right 2009   • FINGER SURGERY     • ROTATOR CUFF REPAIR Right    • SKIN CANCER EXCISION     • SPINAL FUSION  2016    with Bone spur shaved off         Family History   Problem Relation Age of Onset   • Arthritis Mother    • Cancer Mother    • Hypertension Mother    • Thyroid disease Mother    • Cancer Father    • Liver disease Father    • Osteoporosis Maternal Grandmother    • Hypertension Maternal Grandfather    • Heart attack Maternal Grandfather    • Heart disease Maternal Grandfather    • Kidney disease Paternal Grandmother        Social History     Socioeconomic History   • Marital status:    Tobacco Use   • Smoking status: Former Smoker     Types: Cigarettes     Quit date:      Years since quittin.6   • Smokeless tobacco: Never Used   Substance and Sexual Activity   • Alcohol use: No   • Drug use: No   • Sexual activity: Defer           Objective   Physical Exam  Vitals and nursing note reviewed.     /80   Pulse 100   Temp 98.6 °F (37 °C) (Oral)   Resp 18   Ht 162.6 cm (64\")   Wt 77.1 kg (170 lb)   SpO2 93%   BMI 29.18 kg/m²     GEN: No acute distress, sitting upright in the stretcher.  She is awake and alert.  She is answering questions appropriately.  Head: Normocephalic, atraumatic, no specific tenderness over the skull.  She does have some tenderness over the frontal and maxillary sinuses, no " specific deformity palpated  Neck: Mild midline tenderness noted, no deformity or step-offs appreciated.  Eyes: EOM intact  ENT: Posterior pharynx normal in appearance, lips appear dry, tongue is midline, no facial asymmetry, no obvious ecchymosis  Chest: Nontender to palpation  Cardiovascular: Regular rate and rhythm  Lungs: Clear to auscultation bilaterally without adventitious sounds  Abdomen: Soft, nontender, nondistended, no peritoneal signs, no guarding  Extremities: Patient has tenderness over the left anterior shoulder, no obvious bruising, no tenderness of elbow or wrist of the left arm, right upper extremity unremarkable.  No tenderness over the lower extremities.  Distal pulses are intact  Neuro: GCS 15  Psych: Mood and affect are appropriate    Procedures           ED Course  ED Course as of 08/12/22 1701   Fri Aug 12, 2022   1005 EKG interpreted by me: Sinus rhythm, normal rate, no acute ST/T changes, low-voltage QRS complexes throughout, this is an atypical EKG [MP]   1013 Troponin T: <0.010 [LA]   1013 proBNP: 93.3 [LA]   1013 WBC: 4.41 [LA]   1013 Hemoglobin: 14.8 [LA]   1013 Platelets(!): 106 [LA]   1013 Glucose(!): 108 [LA]   1013 BUN: 12 [LA]   1013 Creatinine: 0.75 [LA]   1013 Sodium(!): 135 [LA]   1013 Potassium: 3.7 [LA]   1013 Chloride: 98 [LA]   1013 CO2: 25.6 [LA]   1013 Calcium: 8.8 [LA]   1013 Total Protein: 6.5 [LA]   1013 Albumin: 3.90 [LA]   1013 ALT (SGPT): 24 [LA]   1013 AST (SGOT): 32 [LA]   1013 Alkaline Phosphatase: 101 [LA]   1013 Total Bilirubin: 0.3 [LA]   1013 Globulin: 2.6 [LA]   1013 A/G Ratio: 1.5 [LA]   1013 BUN/Creatinine Ratio: 16.0 [LA]   1013 Anion Gap: 11.4 [LA]   1013 eGFR: 90.7 [LA]   1013 Magnesium: 1.8 [LA]   1013 Per RN note, patient refused c-collar [LA]   1123 COVID19(!!): Detected [LA]   1123 Influenza A PCR: Not Detected [LA]   1123 Influenza B PCR: Not Detected [LA]   1240 FINDINGS:   No acute intracranial hemorrhage or large acute cortical infarct.  Chronic  small vessel ischemic white matter changes and generalized  cerebral volume loss are present. Ventricles are prominent. No midline  shift. The basal cisterns are patent. Bilateral deep brain stimulators  are in place.     No skull fracture. The visualized paranasal sinuses and mastoid air  cells are clear.     IMPRESSION:  No acute intracranial hemorrhage or large acute cortical infarct.        CRITICAL RESULT:  No.     COMMUNICATION:  Per this written report.     Images personally reviewed, interpreted, and dictated by VANDANA Bell.    [LA]   1241 COMPARISON:   CT head dated 10/13/2020.     FINDINGS:   No acute intracranial hemorrhage or large acute cortical infarct.  Chronic small vessel ischemic white matter changes and generalized  cerebral volume loss are present. Ventricles are prominent. No midline  shift. The basal cisterns are patent. Bilateral deep brain stimulators  are in place.     No skull fracture. The visualized paranasal sinuses and mastoid air  cells are clear.     IMPRESSION:  No acute intracranial hemorrhage or large acute cortical infarct.        CRITICAL RESULT:  No.     COMMUNICATION:  Per this written report.     Images personally reviewed, interpreted, and dictated by VANDANA Bell. [LA]   1241 COMPARISON:   CT head dated 10/13/2020.     FINDINGS:   No acute intracranial hemorrhage or large acute cortical infarct.  Chronic small vessel ischemic white matter changes and generalized  cerebral volume loss are present. Ventricles are prominent. No midline  shift. The basal cisterns are patent. Bilateral deep brain stimulators  are in place.     No skull fracture. The visualized paranasal sinuses and mastoid air  cells are clear.     IMPRESSION:  No acute intracranial hemorrhage or large acute cortical infarct.        CRITICAL RESULT:  No.     COMMUNICATION:  Per this written report.     Images personally reviewed, interpreted, and dictated by VANDANA Bell. [LA]    1241    IMPRESSION:  No pulmonary embolus.      Bilateral lower lobe subsegmental atelectasis. Otherwise no acute  findings in the chest.           Images personally reviewed, interpreted and dictated by VANDANA Bell.        2004.11 mGy.cm        This study was performed with techniques to keep radiation doses as low  as reasonably achievable (ALARA). Individualized dose reduction  techniques using automated exposure control or adjustment of mA and/or  kV according to the patient size were employed.      This report was signed and finalized on 8/12/2022 12:38 PM by VANDANA Bell. [LA]   1241 FINDINGS:  No fracture, subluxation, or dislocation is identified. The glenohumeral  joint is well approximated. Mild glenohumeral joint degenerative changes  Mild acromioclavicular joint degenerative changes osseous mineralization  is within normal limits. No soft tissue abnormalities. No abnormalities  in the visualized portions of the lungs.     IMPRESSION:  No acute osseous findings. Mild glenohumeral and acromioclavicular joint  degenerative changes.           Images personally reviewed, interpreted and dictated by VANDANA Bell.                This report was signed and finalized on 8/12/2022 12:22 PM by VANDANA Bell. [LA]   1241 Patient is resting comfortably in stretcher, vital signs are stable.  Discussed these findings with the patient.  She states she is concerned about being unsteady on her feet at home.  She does have a  that helps care for her.  We will try to ambulate her with a walker at bedside [LA]   1320 RN states that patient was able to ambulate with walker without difficulty.  Her vital signs have remained stable.  She does not meet requirements for antiviral Paxlovid, is on the medications that would potentially react. [LA]      ED Course User Index  [LA] Alma Romero PA-C  [MP] Toro Koch MD      Lab Results (last 24 hours)     Procedure  Component Value Units Date/Time    CBC & Differential [[id number]]  (Abnormal) Collected: 08/12/22 0937    Specimen: Blood Updated: 08/12/22 1010    Narrative:      The following orders were created for panel order CBC & Differential.  Procedure                               Abnormality         Status                     ---------                               -----------         ------                     CBC Auto Differential[[id number]]        Abnormal            Final result               Scan Slide[[id number]]                                       Final result                 Please view results for these tests on the individual orders.    Comprehensive Metabolic Panel [[id number]]  (Abnormal) Collected: 08/12/22 0937    Specimen: Blood Updated: 08/12/22 1010     Glucose 108 mg/dL      BUN 12 mg/dL      Creatinine 0.75 mg/dL      Sodium 135 mmol/L      Potassium 3.7 mmol/L      Comment: Slight hemolysis detected by analyzer. Results may be affected.        Chloride 98 mmol/L      CO2 25.6 mmol/L      Calcium 8.8 mg/dL      Total Protein 6.5 g/dL      Albumin 3.90 g/dL      ALT (SGPT) 24 U/L      AST (SGOT) 32 U/L      Comment: Slight hemolysis detected by analyzer. Results may be affected.        Alkaline Phosphatase 101 U/L      Total Bilirubin 0.3 mg/dL      Globulin 2.6 gm/dL      A/G Ratio 1.5 g/dL      BUN/Creatinine Ratio 16.0     Anion Gap 11.4 mmol/L      eGFR 90.7 mL/min/1.73      Comment: National Kidney Foundation and American Society of Nephrology (ASN) Task Force recommended calculation based on the Chronic Kidney Disease Epidemiology Collaboration (CKD-EPI) equation refit without adjustment for race.       Narrative:      GFR Normal >60  Chronic Kidney Disease <60  Kidney Failure <15      Troponin [[id number]]  (Normal) Collected: 08/12/22 0937    Specimen: Blood Updated: 08/12/22 1011     Troponin T <0.010 ng/mL     Narrative:      Troponin T Reference Range:  <= 0.03 ng/mL-   Negative for AMI  >0.03  ng/mL-     Abnormal for myocardial necrosis.  Clinicians would have to utilize clinical acumen, EKG, Troponin and serial changes to determine if it is an Acute Myocardial Infarction or myocardial injury due to an underlying chronic condition.       Results may be falsely decreased if patient taking Biotin.      BNP [[id number]]  (Normal) Collected: 08/12/22 0937    Specimen: Blood Updated: 08/12/22 1011     proBNP 93.3 pg/mL     Narrative:      Among patients with dyspnea, NT-proBNP is highly sensitive for the detection of acute congestive heart failure. In addition NT-proBNP of <300 pg/ml effectively rules out acute congestive heart failure with 99% negative predictive value.    Results may be falsely decreased if patient taking Biotin.      Magnesium [[id number]]  (Normal) Collected: 08/12/22 0937    Specimen: Blood Updated: 08/12/22 1008     Magnesium 1.8 mg/dL     CBC Auto Differential [[id number]]  (Abnormal) Collected: 08/12/22 0937    Specimen: Blood Updated: 08/12/22 1010     WBC 4.41 10*3/mm3      RBC 4.78 10*6/mm3      Hemoglobin 14.8 g/dL      Hematocrit 42.9 %      MCV 89.7 fL      MCH 31.0 pg      MCHC 34.5 g/dL      RDW 12.2 %      RDW-SD 40.7 fl      MPV 13.1 fL      Platelets 106 10*3/mm3      Neutrophil % 78.5 %      Lymphocyte % 7.7 %      Monocyte % 13.4 %      Eosinophil % 0.0 %      Basophil % 0.2 %      Immature Grans % 0.2 %      Neutrophils, Absolute 3.46 10*3/mm3      Lymphocytes, Absolute 0.34 10*3/mm3      Monocytes, Absolute 0.59 10*3/mm3      Eosinophils, Absolute 0.00 10*3/mm3      Basophils, Absolute 0.01 10*3/mm3      Immature Grans, Absolute 0.01 10*3/mm3      nRBC 0.0 /100 WBC     Scan Slide [[id number]] Collected: 08/12/22 0937    Specimen: Blood Updated: 08/12/22 1010     RBC Morphology Normal     WBC Morphology Normal     Platelet Estimate Decreased    COVID-19 and FLU A/B PCR - Swab, Nasopharynx [[id number]]  (Abnormal) Collected: 08/12/22 0939    Specimen: Swab from Nasopharynx  Updated: 08/12/22 1013     COVID19 Detected     Influenza A PCR Not Detected     Influenza B PCR Not Detected    Narrative:      Fact sheet for providers: https://www.fda.gov/media/491792/download    Fact sheet for patients: https://www.fda.gov/media/890720/download    Test performed by PCR.  Influenza A and Influenza B negative results should be considered presumptive in samples that have a positive SARS-CoV-2 result.    Competitive inhibition studies showed that SARS-CoV-2 virus, when present at concentrations above 3.6E+04 copies/mL, can inhibit the detection and amplification of influenza A and influenza B virus RNA if present at or below 1.8E+02 copies/mL or 4.9E+02 copies/mL, respectively, and may lead to false negative influenza virus results. If co-infection with influenza A or influenza B virus is suspected in samples with a positive SARS-CoV-2 result, the sample should be re-tested with another FDA cleared, approved, or authorized influenza test, if influenza virus detection would change clinical management.          XR Shoulder 2+ View Left    Result Date: 8/12/2022  CLINICAL INDICATION:  Fall, left anterior shoulder pain  EXAMINATION TECHNIQUE: XR SHOULDER 2+ VW LEFT-  COMPARISON: None.  FINDINGS: No fracture, subluxation, or dislocation is identified. The glenohumeral joint is well approximated. Mild glenohumeral joint degenerative changes Mild acromioclavicular joint degenerative changes osseous mineralization is within normal limits. No soft tissue abnormalities. No abnormalities in the visualized portions of the lungs.      Impression: No acute osseous findings. Mild glenohumeral and acromioclavicular joint degenerative changes.    Images personally reviewed, interpreted and dictated by VANDANA Bell.       This report was signed and finalized on 8/12/2022 12:22 PM by VANDANA Bell.    CT Head Without Contrast    Result Date: 8/12/2022  HEAD CT     8/12/2022 10:48 AM  HISTORY: Fall,  head injury  TECHNIQUE: Multiple axial CT images were performed from the foramen magnum to the vertex. Coronal reformatted images were reconstructed from axial data set. This study was performed with techniques to keep radiation doses as low as reasonably achievable (ALARA). Individualized dose reduction techniques using automated exposure control or adjustment of mA and/or kV according to the patient size were employed.  COMPARISON: CT head dated 10/13/2020.  FINDINGS: No acute intracranial hemorrhage or large acute cortical infarct. Chronic small vessel ischemic white matter changes and generalized cerebral volume loss are present. Ventricles are prominent. No midline shift. The basal cisterns are patent. Bilateral deep brain stimulators are in place.  No skull fracture. The visualized paranasal sinuses and mastoid air cells are clear.      Impression: No acute intracranial hemorrhage or large acute cortical infarct.   CRITICAL RESULT: No.  COMMUNICATION: Per this written report.  Images personally reviewed, interpreted, and dictated by VANDANA Bell.              ORBIT/FACE CT     8/12/2022 10:48 AM  HISTORY: Fall, head injury Facial pain from recent trauma.  COMPARISON: CT face dated 10/13/2020.  TECHNIQUE: Multiple axial CT sections were performed through the face without enhancement. Coronal reconstruction images were performed. This study was performed with techniques to keep radiation doses as low as reasonably achievable, (ALARA). Individualized dose reduction techniques using automated exposure control or adjustment of mA and/or kV according to the patient size were employed.  Findings: Diagnostic quality: Adequate.  Facial bones: No acute fracture is present.  Orbital soft tissues: The globes are normal in appearance. No retrobulbar hematoma or mass is present. There is no significant preseptal soft tissue swelling.  Paranasal sinuses: Small mucous retention cyst in the left maxillary sinus.  Minimal bilateral maxillary sinus mucosal thickening, left greater than right. The remaining visualized portions of the paranasal sinuses are clear. The mastoid air cells are clear.  Soft tissues: No significant soft tissue swelling is present.  IMPRESSION: No acute facial fracture is present.    Images personally reviewed, interpreted and dictated by VANDANA Bell.      This study was performed with techniques to keep radiation doses as low as reasonably achievable (ALARA). Individualized dose reduction techniques using automated exposure control or adjustment of mA and/or kV according to the patient size were employed.          CT CERVICAL SPINE     8/12/2022 10:48 AM  HISTORY: Status post fall with neck pain.Fall, head injury  COMPARISON:  None.  PROCEDURE: Axial images were obtained from the skull base to the thoracic inlet by computed tomography. 3 D reconstruction images were performed. This study was performed with techniques to keep radiation doses as low as reasonably achievable, (ALARA). Individualized dose reduction techniques using automated exposure control or adjustment of mA and/or kV according to the patient size were employed.   FINDINGS: Postsurgical changes from C5-C6 ACDF. No evidence of hardware failure or loosening. There is osseous fusion with loss of intervertebral disc space at C5-C6. There is no acute fracture or malalignment of the cervical spine. The facets are normally aligned. There is mild straightening of the upper cervical spine with preservation of cervical lordosis at C5-C6. There are large anterior osteophytes at C6-C7. No significant degenerative changes are present. No acute paraspinal abnormality. Limited images of the lung apices are unremarkable.  IMPRESSION: No acute of the cervical spine. Postsurgical and degenerative changes as above.     Images personally reviewed, interpreted and dictated by VANDANA Bell.           This report was signed and finalized on  8/12/2022 12:32 PM by VANDANA Bell.    CT Cervical Spine Without Contrast    Result Date: 8/12/2022  HEAD CT     8/12/2022 10:48 AM  HISTORY: Fall, head injury  TECHNIQUE: Multiple axial CT images were performed from the foramen magnum to the vertex. Coronal reformatted images were reconstructed from axial data set. This study was performed with techniques to keep radiation doses as low as reasonably achievable (ALARA). Individualized dose reduction techniques using automated exposure control or adjustment of mA and/or kV according to the patient size were employed.  COMPARISON: CT head dated 10/13/2020.  FINDINGS: No acute intracranial hemorrhage or large acute cortical infarct. Chronic small vessel ischemic white matter changes and generalized cerebral volume loss are present. Ventricles are prominent. No midline shift. The basal cisterns are patent. Bilateral deep brain stimulators are in place.  No skull fracture. The visualized paranasal sinuses and mastoid air cells are clear.      Impression: No acute intracranial hemorrhage or large acute cortical infarct.   CRITICAL RESULT: No.  COMMUNICATION: Per this written report.  Images personally reviewed, interpreted, and dictated by VANDANA Bell.              ORBIT/FACE CT     8/12/2022 10:48 AM  HISTORY: Fall, head injury Facial pain from recent trauma.  COMPARISON: CT face dated 10/13/2020.  TECHNIQUE: Multiple axial CT sections were performed through the face without enhancement. Coronal reconstruction images were performed. This study was performed with techniques to keep radiation doses as low as reasonably achievable, (ALARA). Individualized dose reduction techniques using automated exposure control or adjustment of mA and/or kV according to the patient size were employed.  Findings: Diagnostic quality: Adequate.  Facial bones: No acute fracture is present.  Orbital soft tissues: The globes are normal in appearance. No retrobulbar hematoma  or mass is present. There is no significant preseptal soft tissue swelling.  Paranasal sinuses: Small mucous retention cyst in the left maxillary sinus. Minimal bilateral maxillary sinus mucosal thickening, left greater than right. The remaining visualized portions of the paranasal sinuses are clear. The mastoid air cells are clear.  Soft tissues: No significant soft tissue swelling is present.  IMPRESSION: No acute facial fracture is present.    Images personally reviewed, interpreted and dictated by VANDANA Bell.      This study was performed with techniques to keep radiation doses as low as reasonably achievable (ALARA). Individualized dose reduction techniques using automated exposure control or adjustment of mA and/or kV according to the patient size were employed.          CT CERVICAL SPINE     8/12/2022 10:48 AM  HISTORY: Status post fall with neck pain.Fall, head injury  COMPARISON:  None.  PROCEDURE: Axial images were obtained from the skull base to the thoracic inlet by computed tomography. 3 D reconstruction images were performed. This study was performed with techniques to keep radiation doses as low as reasonably achievable, (ALARA). Individualized dose reduction techniques using automated exposure control or adjustment of mA and/or kV according to the patient size were employed.   FINDINGS: Postsurgical changes from C5-C6 ACDF. No evidence of hardware failure or loosening. There is osseous fusion with loss of intervertebral disc space at C5-C6. There is no acute fracture or malalignment of the cervical spine. The facets are normally aligned. There is mild straightening of the upper cervical spine with preservation of cervical lordosis at C5-C6. There are large anterior osteophytes at C6-C7. No significant degenerative changes are present. No acute paraspinal abnormality. Limited images of the lung apices are unremarkable.  IMPRESSION: No acute of the cervical spine. Postsurgical and  degenerative changes as above.     Images personally reviewed, interpreted and dictated by VANDANA Bell.           This report was signed and finalized on 8/12/2022 12:32 PM by VANDANA Bell.    CT Angiogram Chest Pulmonary Embolism    Result Date: 8/12/2022  CTA/PE PROTOCOL CHEST CT:     8/12/2022 10:49 AM  CLINICAL INDICATION: Tachycardia, syncopal episode, low oxygen level  TECHNIQUE: Multiple axial CT images were obtained through the chest following IV contrast utilizing a CTA/PE protocol. 3D/MIP Reconstruction images were also performed. This study was performed with techniques to keep radiation doses as low as reasonably achievable, (ALARA). Individualized dose reduction techniques using automated exposure control or adjustment of mA and/or kV according to the patient size were employed.  COMPARISON: None.  FINDINGS: PA's and Aorta: No pulmonary embolus. Thoracic aorta is normal in caliber. No significant coronary artery calcifications.  Heart/mediastinum: No evidence for right heart strain. No pericardial effusion. The heart is normal in size.  Lungs/Pleura: The airways are patent. No suspicious pulmonary nodules or consolidations. Bilateral lower lobe subsegmental atelectasis. No pneumothorax or pleural effusion.  Lymph nodes: No pathologically enlarged thoracic lymph nodes.  Chest wall: No acute findings. Deep brain stimulator generator is present in the right anterior chest wall. No complications are evident.  Bones: No acute fracture.  Upper abdomen: No acute findings in the upper abdomen. Hepatic steatosis.      Impression: No pulmonary embolus.  Bilateral lower lobe subsegmental atelectasis. Otherwise no acute findings in the chest.    Images personally reviewed, interpreted and dictated by VANDANA Bell.   2004.11 mGy.cm   This study was performed with techniques to keep radiation doses as low as reasonably achievable (ALARA). Individualized dose reduction techniques using  automated exposure control or adjustment of mA and/or kV according to the patient size were employed.  This report was signed and finalized on 8/12/2022 12:38 PM by VANDANA Bell.    CT Facial Bones Without Contrast    Result Date: 8/12/2022  HEAD CT     8/12/2022 10:48 AM  HISTORY: Fall, head injury  TECHNIQUE: Multiple axial CT images were performed from the foramen magnum to the vertex. Coronal reformatted images were reconstructed from axial data set. This study was performed with techniques to keep radiation doses as low as reasonably achievable (ALARA). Individualized dose reduction techniques using automated exposure control or adjustment of mA and/or kV according to the patient size were employed.  COMPARISON: CT head dated 10/13/2020.  FINDINGS: No acute intracranial hemorrhage or large acute cortical infarct. Chronic small vessel ischemic white matter changes and generalized cerebral volume loss are present. Ventricles are prominent. No midline shift. The basal cisterns are patent. Bilateral deep brain stimulators are in place.  No skull fracture. The visualized paranasal sinuses and mastoid air cells are clear.      Impression: No acute intracranial hemorrhage or large acute cortical infarct.   CRITICAL RESULT: No.  COMMUNICATION: Per this written report.  Images personally reviewed, interpreted, and dictated by VANDANA Bell.              ORBIT/FACE CT     8/12/2022 10:48 AM  HISTORY: Fall, head injury Facial pain from recent trauma.  COMPARISON: CT face dated 10/13/2020.  TECHNIQUE: Multiple axial CT sections were performed through the face without enhancement. Coronal reconstruction images were performed. This study was performed with techniques to keep radiation doses as low as reasonably achievable, (ALARA). Individualized dose reduction techniques using automated exposure control or adjustment of mA and/or kV according to the patient size were employed.  Findings: Diagnostic quality:  Adequate.  Facial bones: No acute fracture is present.  Orbital soft tissues: The globes are normal in appearance. No retrobulbar hematoma or mass is present. There is no significant preseptal soft tissue swelling.  Paranasal sinuses: Small mucous retention cyst in the left maxillary sinus. Minimal bilateral maxillary sinus mucosal thickening, left greater than right. The remaining visualized portions of the paranasal sinuses are clear. The mastoid air cells are clear.  Soft tissues: No significant soft tissue swelling is present.  IMPRESSION: No acute facial fracture is present.    Images personally reviewed, interpreted and dictated by VANDANA Bell.      This study was performed with techniques to keep radiation doses as low as reasonably achievable (ALARA). Individualized dose reduction techniques using automated exposure control or adjustment of mA and/or kV according to the patient size were employed.          CT CERVICAL SPINE     8/12/2022 10:48 AM  HISTORY: Status post fall with neck pain.Fall, head injury  COMPARISON:  None.  PROCEDURE: Axial images were obtained from the skull base to the thoracic inlet by computed tomography. 3 D reconstruction images were performed. This study was performed with techniques to keep radiation doses as low as reasonably achievable, (ALARA). Individualized dose reduction techniques using automated exposure control or adjustment of mA and/or kV according to the patient size were employed.   FINDINGS: Postsurgical changes from C5-C6 ACDF. No evidence of hardware failure or loosening. There is osseous fusion with loss of intervertebral disc space at C5-C6. There is no acute fracture or malalignment of the cervical spine. The facets are normally aligned. There is mild straightening of the upper cervical spine with preservation of cervical lordosis at C5-C6. There are large anterior osteophytes at C6-C7. No significant degenerative changes are present. No acute  paraspinal abnormality. Limited images of the lung apices are unremarkable.  IMPRESSION: No acute of the cervical spine. Postsurgical and degenerative changes as above.     Images personally reviewed, interpreted and dictated by VANDANA Bell.           This report was signed and finalized on 8/12/2022 12:32 PM by VANDANA Bell.                                         MDM  Number of Diagnoses or Management Options  Acute pain of left shoulder  COVID-19  Fall, initial encounter  Nasal congestion  Diagnosis management comments: On arrival, patient is stable.  She is afebrile here she is midly tachycardic at 101 and saturating 92% on room air.  Patient does not use oxygen at home.  Differential could include ACS, cardiac dysrhythmia, bronchitis, pneumonia, viral illness, pulmonary embolism, closed head injury, cervical spine injury, shoulder fracture, dislocation, and other concerns.  Patient is denying shortness of breath or chest pain at this time but given the fact that she had a syncopal episode and is tachycardic with an O2 of 92%, will obtain CT PE protocol.  Given her fall, will also obtain a noncontrasted CT scan of her head, facial bones and cervical spine, xray left shoulder.  Will obtain basic labs, troponin, proBNP, magnesium, EKG, COVID swab, urinalysis.  Will give small bolus of IV fluids.    Patient's labs stable.  She had no episodes of hypoxia.  There is no significant electrolyte abnormalities, no significant leukocytosis.  No elevation of troponin or proBNP.  Patient was COVID-positive.  We did not obtain a urine sample given patient had no fever and was complaining of no urinary symptoms.  CT of the head, cervical spine and facial bones revealed no acute abnormality per radiology read.  CT PE protocol revealed no pulmonary embolism per radiology.  Discussed all these findings with the patient.  We did give her an inhaler.  She was requesting decongestant.  Discussed with patient that  she would not necessarily need admission to the hospital unless she was having difficulty caring for herself or ambulating.  She was able to get up with a bedside walker and ambulated without difficulty so she felt comfortable going home.  We discussed symptomatic treatment, follow-up and strict return precautions.  She verbalized understanding and was in agreement with this plan of care       Amount and/or Complexity of Data Reviewed  Clinical lab tests: reviewed and ordered  Tests in the radiology section of CPT®: reviewed and ordered  Tests in the medicine section of CPT®: reviewed  Discussion of test results with the performing providers: yes  Review and summarize past medical records: yes  Discuss the patient with other providers: yes    Risk of Complications, Morbidity, and/or Mortality  Presenting problems: moderate  Diagnostic procedures: moderate  Management options: low    Patient Progress  Patient progress: stable      Final diagnoses:   COVID-19   Fall, initial encounter   Nasal congestion   Acute pain of left shoulder       ED Disposition  ED Disposition     ED Disposition   Discharge    Condition   Stable    Comment   --             Brigida Villarreal, APRN  [address] [facility] [address]  [place] [id number] [phone]    Schedule an appointment as soon as possible for a visit            Medication List      New Prescriptions    brompheniramine-pseudoephedrine-DM 30-2-10 MG/5ML syrup  Take 5 mL by mouth 4 (Four) Times a Day As Needed for Congestion or Cough.     pseudoephedrine 120 MG 12 hr tablet  Commonly known as: Sudafed 12 Hour  Take 1 tablet by mouth Every 12 (Twelve) Hours for 5 days.           Where to Get Your Medications      These medications were sent to [id number] - [place], [place] - [address] - [phone]  - [phone] FX  [address] [place] [address]    Phone: [phone]   · brompheniramine-pseudoephedrine-DM 30-2-10 MG/5ML syrup  · pseudoephedrine  120 MG 12 hr tablet          Alma Romero PA-C  08/12/22 8943

## 2022-08-12 NOTE — DISCHARGE INSTRUCTIONS
You tested positive for COVID-19.  You can take decongestant and cough medication as needed.  Use albuterol 2 puffs every 4 hours as needed for chest tightness.  Use incentive spirometer to ensure you are taking deep breaths.  Drink plenty of fluids to stay well-hydrated.  You will need to quarantine per CDC guidelines.  Follow-up with your primary care provider when possible.  Return to ER for any change, worsening of symptoms, or any additional concerns including but not limited to difficulty breathing, oxygen levels less than 90% persistently, chest pain, dizziness, syncope.

## 2022-09-09 ENCOUNTER — OFFICE VISIT (OUTPATIENT)
Dept: ENDOCRINOLOGY | Facility: CLINIC | Age: 62
End: 2022-09-09

## 2022-09-09 VITALS
HEIGHT: 64 IN | OXYGEN SATURATION: 96 % | BODY MASS INDEX: 29.18 KG/M2 | DIASTOLIC BLOOD PRESSURE: 74 MMHG | HEART RATE: 54 BPM | SYSTOLIC BLOOD PRESSURE: 120 MMHG

## 2022-09-09 DIAGNOSIS — E66.09 CLASS 1 OBESITY DUE TO EXCESS CALORIES WITHOUT SERIOUS COMORBIDITY WITH BODY MASS INDEX (BMI) OF 31.0 TO 31.9 IN ADULT: ICD-10-CM

## 2022-09-09 DIAGNOSIS — E88.81 INSULIN RESISTANCE: ICD-10-CM

## 2022-09-09 DIAGNOSIS — E28.2 PCOS (POLYCYSTIC OVARIAN SYNDROME): Primary | ICD-10-CM

## 2022-09-09 PROCEDURE — 99214 OFFICE O/P EST MOD 30 MIN: CPT | Performed by: INTERNAL MEDICINE

## 2022-09-09 RX ORDER — SEMAGLUTIDE 2.4 MG/.75ML
2.4 INJECTION, SOLUTION SUBCUTANEOUS WEEKLY
Qty: 3 ML | Refills: 3 | Status: SHIPPED | OUTPATIENT
Start: 2022-09-09

## 2022-09-09 RX ORDER — SEMAGLUTIDE 1.7 MG/.75ML
1.7 INJECTION, SOLUTION SUBCUTANEOUS WEEKLY
Qty: 0.75 ML | Refills: 6 | Status: SHIPPED | OUTPATIENT
Start: 2022-09-09

## 2022-09-09 NOTE — PROGRESS NOTES
Subjective:   Polycystic Ovary Syndrome (Follow UP)      Kelly Meza is a 62 y.o. female who is being seen for follow-up for hyperglycemia, abnormal thyroid function tests and obesity. She has features of insulin resistance.   Patient reported difficulty with weight loss despite diet and exercises. She has tried different weight loss programs, contrave with minimal effect. She had some features of insulin resistance  5/2018 we have started Saxenda and she tolerated medication well.  Lost >50 lbs.  Changed to Wegovy in 2022 and did well, no side effects. She has discontinued the medication because of the market shortage   She had COVID in Aug 2022 and feeling week now. She is not able to walk [facility]. She had DBS prior to COVID and could walk prior to turning it off during hospitalization       Review of Systems  Review of Systems   Constitutional: Positive for fatigue.   Musculoskeletal: Positive for gait problem.   Neurological: Positive for tremors (and abnormal movements. ), facial asymmetry and weakness.      Current medications:  Current Outpatient Medications   Medication Sig Dispense Refill   • amitriptyline (ELAVIL) 25 MG tablet Take 25 mg by mouth 2 (Two) Times a Day.     • BD Pen Needle Gregoria U/F 32G X 4 MM misc use with pens once daily as directed 100 each 0   • Carbidopa-Levodopa ER (Rytary) 23.75-95 MG capsule controlled-release Take 1 capsule by mouth 3 (Three) Times a Day.     • cholecalciferol (VITAMIN D3) 25 MCG (1000 UT) tablet Take 1,000 Units by mouth Daily.     • clobetasol (TEMOVATE) 0.05 % external solution      • Clocortolone Pivalate Pump 0.1 % cream Apply  topically.     • clonazePAM (KlonoPIN) 1 MG tablet Take 1 mg by mouth At Night As Needed.     • metroNIDAZOLE (METROCREAM) 0.75 % cream apply TOPICALLY to face ONCE DAILY as directed  99   • Multiple Vitamins-Minerals (MULTIVITAMIN ADULT PO) Take  by mouth.     • mupirocin (BACTROBAN) 2 % cream APPLY TOPICALLY TO  AFFECTED AREA(S) TWICE A DAY  0   • QUEtiapine (SEROquel) 100 MG tablet TAKE 1 TO 2 TABLETS BY MOUTH 2 HOURS BEFORE  BEDTIME 60 tablet 4   • Semaglutide-Weight Management (Wegovy) 2.4 MG/0.75ML solution auto-injector Inject 2.4 mg under the skin into the appropriate area as directed 1 (One) Time Per Week. 3 mL 3   • Semaglutide-Weight Management (Wegovy) 1.7 MG/0.75ML solution auto-injector Inject 0.75 mL under the skin into the appropriate area as directed 1 (One) Time Per Week. 0.75 mL 6     No current facility-administered medications for this visit.           Objective:     Vitals:    09/09/22 1118   BP: 120/74   Pulse: 54   SpO2: 96%   Body mass index is 29.18 kg/m².  Physical Exam  Vitals reviewed.   Constitutional:       Appearance: Normal appearance.   Cardiovascular:      Rate and Rhythm: Normal rate and regular rhythm.      Pulses: Normal pulses.      Heart sounds: Normal heart sounds.   Pulmonary:      Effort: Pulmonary effort is normal.      Breath sounds: Normal breath sounds.   Musculoskeletal:         General: No swelling.   Neurological:      Mental Status: She is alert and oriented to person, place, and time.      Gait: Gait abnormal.      Comments: Tardive dyskinesia notes. Stiffnes of the muscles.    Psychiatric:         Mood and Affect: Mood normal.         Thought Content: Thought content normal.           LABS AND IMAGING        Results for orders placed or performed during the hospital encounter of 08/12/22   COVID-19 and FLU A/B PCR - Swab, Nasopharynx    Specimen: Nasopharynx; Swab   Result Value Ref Range    COVID19 Detected (C) Not Detected - Ref. Range    Influenza A PCR Not Detected Not Detected    Influenza B PCR Not Detected Not Detected   Comprehensive Metabolic Panel    Specimen: Blood   Result Value Ref Range    Glucose 108 (H) 65 - 99 mg/dL    BUN 12 8 - 23 mg/dL    Creatinine 0.75 0.57 - 1.00 mg/dL    Sodium 135 (L) 136 - 145 mmol/L    Potassium 3.7 3.5 - 5.2 mmol/L    Chloride 98  98 - 107 mmol/L    CO2 25.6 22.0 - 29.0 mmol/L    Calcium 8.8 8.6 - 10.5 mg/dL    Total Protein 6.5 6.0 - 8.5 g/dL    Albumin 3.90 3.50 - 5.20 g/dL    ALT (SGPT) 24 1 - 33 U/L    AST (SGOT) 32 1 - 32 U/L    Alkaline Phosphatase 101 39 - 117 U/L    Total Bilirubin 0.3 0.0 - 1.2 mg/dL    Globulin 2.6 gm/dL    A/G Ratio 1.5 g/dL    BUN/Creatinine Ratio 16.0 7.0 - 25.0    Anion Gap 11.4 5.0 - 15.0 mmol/L    eGFR 90.7 >60.0 mL/min/1.73   Troponin    Specimen: Blood   Result Value Ref Range    Troponin T <0.010 0.000 - 0.030 ng/mL   BNP    Specimen: Blood   Result Value Ref Range    proBNP 93.3 0.0 - 900.0 pg/mL   Magnesium    Specimen: Blood   Result Value Ref Range    Magnesium 1.8 1.6 - 2.4 mg/dL   CBC Auto Differential    Specimen: Blood   Result Value Ref Range    WBC 4.41 3.40 - 10.80 10*3/mm3    RBC 4.78 3.77 - 5.28 10*6/mm3    Hemoglobin 14.8 12.0 - 15.9 g/dL    Hematocrit 42.9 34.0 - 46.6 %    MCV 89.7 79.0 - 97.0 fL    MCH 31.0 26.6 - 33.0 pg    MCHC 34.5 31.5 - 35.7 g/dL    RDW 12.2 (L) 12.3 - 15.4 %    RDW-SD 40.7 37.0 - 54.0 fl    MPV 13.1 (H) 6.0 - 12.0 fL    Platelets 106 (L) 140 - 450 10*3/mm3    Neutrophil % 78.5 (H) 42.7 - 76.0 %    Lymphocyte % 7.7 (L) 19.6 - 45.3 %    Monocyte % 13.4 (H) 5.0 - 12.0 %    Eosinophil % 0.0 (L) 0.3 - 6.2 %    Basophil % 0.2 0.0 - 1.5 %    Immature Grans % 0.2 0.0 - 0.5 %    Neutrophils, Absolute 3.46 1.70 - 7.00 10*3/mm3    Lymphocytes, Absolute 0.34 (L) 0.70 - 3.10 10*3/mm3    Monocytes, Absolute 0.59 0.10 - 0.90 10*3/mm3    Eosinophils, Absolute 0.00 0.00 - 0.40 10*3/mm3    Basophils, Absolute 0.01 0.00 - 0.20 10*3/mm3    Immature Grans, Absolute 0.01 0.00 - 0.05 10*3/mm3    nRBC 0.0 0.0 - 0.2 /100 WBC   Scan Slide    Specimen: Blood   Result Value Ref Range    RBC Morphology Normal Normal    WBC Morphology Normal Normal    Platelet Estimate Decreased Normal   Green Top (Gel)   Result Value Ref Range    Extra Tube Hold for add-ons.    Lavender Top   Result Value Ref  Range    Extra Tube hold for add-on    Gold Top - SST   Result Value Ref Range    Extra Tube Hold for add-ons.    Light Blue Top   Result Value Ref Range    Extra Tube Hold for add-ons.         Assessment:        Problem List Items Addressed This Visit        Other    PCOS (polycystic ovarian syndrome) - Primary    Class 1 obesity due to excess calories without serious comorbidity with body mass index (BMI) of 31.0 to 31.9 in adult    Insulin resistance               Plan:         Continue Wegovy I have given the rx for 1.7 mg or 2.4 mg if needed.  Recommended Vit D supplements.      Follow-up in 3-4  months

## 2022-09-16 ENCOUNTER — TELEPHONE (OUTPATIENT)
Dept: ENDOCRINOLOGY | Facility: CLINIC | Age: 62
End: 2022-09-16

## 2022-09-16 NOTE — TELEPHONE ENCOUNTER
Response from CMM      Prior Authorization Required 75 [id number] // E66.09-Other obesity due to excess calories // *criteria not met multiple denials on file - SK, Intake Agent 09/16/2022 06:55 AM

## 2022-10-05 RX ORDER — SEMAGLUTIDE 2.4 MG/.75ML
2.4 INJECTION, SOLUTION SUBCUTANEOUS WEEKLY
Qty: 3 ML | Refills: 3 | Status: CANCELLED | OUTPATIENT
Start: 2022-10-05

## 2022-10-05 NOTE — TELEPHONE ENCOUNTER
PATIENT IS CALLING TO CHECK STATUS OF PRIOR AUTH FOR WEGOVY MEDICATION. WITHOUT INSURANCE COVERING IT THIS MEDICATION IS $1600.00. SHE NEEDS US TO SEND THE INFORMATION INSURANCE NEEDS FOR IT TO BE COVERED. PHONE NUMBER [phone]

## 2022-10-05 NOTE — TELEPHONE ENCOUNTER
Returned pt call She stated that she was getting the medication. She stated she received a letter asking what other medications she has tried and failed. She stated Saxdon, but could not remember the names of the other medication. I asked her to contact her Insurance to see exactly what they need, we are happy to send office notes etc.

## 2023-01-06 ENCOUNTER — TRANSCRIBE ORDERS (OUTPATIENT)
Dept: ADMINISTRATIVE | Facility: HOSPITAL | Age: 63
End: 2023-01-06
Payer: COMMERCIAL

## 2023-01-06 DIAGNOSIS — Z12.31 ENCOUNTER FOR SCREENING MAMMOGRAM FOR MALIGNANT NEOPLASM OF BREAST: Primary | ICD-10-CM

## 2023-02-07 ENCOUNTER — TRANSCRIBE ORDERS (OUTPATIENT)
Dept: ADMINISTRATIVE | Facility: HOSPITAL | Age: 63
End: 2023-02-07
Payer: COMMERCIAL

## 2023-02-08 ENCOUNTER — TRANSCRIBE ORDERS (OUTPATIENT)
Dept: ADMINISTRATIVE | Facility: HOSPITAL | Age: 63
End: 2023-02-08
Payer: COMMERCIAL

## 2023-02-08 DIAGNOSIS — Z12.31 ENCOUNTER FOR SCREENING MAMMOGRAM FOR MALIGNANT NEOPLASM OF BREAST: Primary | ICD-10-CM

## 2023-05-01 ENCOUNTER — HOSPITAL ENCOUNTER (OUTPATIENT)
Dept: MAMMOGRAPHY | Facility: HOSPITAL | Age: 63
Discharge: HOME | End: 2023-05-01
Admitting: NURSE PRACTITIONER
Payer: COMMERCIAL

## 2023-05-01 DIAGNOSIS — Z12.31 ENCOUNTER FOR SCREENING MAMMOGRAM FOR MALIGNANT NEOPLASM OF BREAST: ICD-10-CM

## 2023-05-01 PROCEDURE — 77063 BREAST TOMOSYNTHESIS BI: CPT

## 2023-05-01 PROCEDURE — 77067 SCR MAMMO BI INCL CAD: CPT

## 2023-05-11 ENCOUNTER — TRANSCRIBE ORDERS (OUTPATIENT)
Dept: ADMINISTRATIVE | Facility: HOSPITAL | Age: 63
End: 2023-05-11
Payer: COMMERCIAL

## 2023-05-11 DIAGNOSIS — N63.0 BREAST NODULE: ICD-10-CM

## 2023-05-11 DIAGNOSIS — R92.8 ABNORMAL MAMMOGRAM: Primary | ICD-10-CM

## 2023-07-28 ENCOUNTER — HOSPITAL ENCOUNTER (OUTPATIENT)
Dept: MAMMOGRAPHY | Facility: HOSPITAL | Age: 63
Discharge: HOME | End: 2023-07-28
Payer: COMMERCIAL

## 2023-07-28 ENCOUNTER — HOSPITAL ENCOUNTER (OUTPATIENT)
Dept: ULTRASOUND IMAGING | Facility: HOSPITAL | Age: 63
Discharge: HOME | End: 2023-07-28
Payer: COMMERCIAL

## 2023-07-28 DIAGNOSIS — R92.8 ABNORMAL MAMMOGRAM: ICD-10-CM

## 2023-07-28 DIAGNOSIS — N63.0 BREAST NODULE: ICD-10-CM

## 2023-07-28 PROCEDURE — 76642 ULTRASOUND BREAST LIMITED: CPT

## 2023-10-24 ENCOUNTER — TELEPHONE (OUTPATIENT)
Dept: SURGERY | Facility: CLINIC | Age: 63
End: 2023-10-24
Payer: COMMERCIAL

## 2023-10-24 NOTE — TELEPHONE ENCOUNTER
CALLED PT IN REGARDS TO CANCELLED APPT 05/22/2020. PT DECLINES TO COME IN AND SAYS SHE IS WELL FROM ENLARGED LYMPH NODE.    SPOKE TO PT ABOUT 10 YEAR COLON RECALL. PT DECLINES TO COME IN SAID SHE DID A COLOGUARD WITH DR. WIMLER Rawls OFFICE.

## 2025-04-10 NOTE — TELEPHONE ENCOUNTER
I tired to send in for her, however for me it only prints thought we would try to have you send it directly instead of me to see if that is the issue    Yes